# Patient Record
Sex: MALE | Race: WHITE | Employment: FULL TIME | URBAN - METROPOLITAN AREA
[De-identification: names, ages, dates, MRNs, and addresses within clinical notes are randomized per-mention and may not be internally consistent; named-entity substitution may affect disease eponyms.]

---

## 2017-01-29 ENCOUNTER — APPOINTMENT (OUTPATIENT)
Dept: GENERAL RADIOLOGY | Facility: CLINIC | Age: 47
End: 2017-01-29
Attending: NURSE PRACTITIONER
Payer: COMMERCIAL

## 2017-01-29 ENCOUNTER — HOSPITAL ENCOUNTER (EMERGENCY)
Facility: CLINIC | Age: 47
Discharge: HOME | End: 2017-01-29
Attending: NURSE PRACTITIONER | Admitting: NURSE PRACTITIONER
Payer: COMMERCIAL

## 2017-01-29 VITALS — OXYGEN SATURATION: 98 % | DIASTOLIC BLOOD PRESSURE: 87 MMHG | TEMPERATURE: 98 F | SYSTOLIC BLOOD PRESSURE: 148 MMHG

## 2017-01-29 DIAGNOSIS — S62.646A CLOSED NONDISPLACED FRACTURE OF PROXIMAL PHALANX OF RIGHT LITTLE FINGER, INITIAL ENCOUNTER: ICD-10-CM

## 2017-01-29 PROCEDURE — 99213 OFFICE O/P EST LOW 20 MIN: CPT

## 2017-01-29 PROCEDURE — 25000132 ZZH RX MED GY IP 250 OP 250 PS 637: Performed by: NURSE PRACTITIONER

## 2017-01-29 PROCEDURE — 29125 APPL SHORT ARM SPLINT STATIC: CPT

## 2017-01-29 PROCEDURE — 29125 APPL SHORT ARM SPLINT STATIC: CPT | Performed by: NURSE PRACTITIONER

## 2017-01-29 PROCEDURE — 99213 OFFICE O/P EST LOW 20 MIN: CPT | Mod: 25 | Performed by: NURSE PRACTITIONER

## 2017-01-29 PROCEDURE — 73130 X-RAY EXAM OF HAND: CPT | Mod: RT

## 2017-01-29 RX ORDER — HYDROCODONE BITARTRATE AND ACETAMINOPHEN 5; 325 MG/1; MG/1
1-2 TABLET ORAL EVERY 4 HOURS PRN
Qty: 15 TABLET | Refills: 0 | Status: SHIPPED | OUTPATIENT
Start: 2017-01-29 | End: 2017-09-12

## 2017-01-29 RX ORDER — HYDROCODONE BITARTRATE AND ACETAMINOPHEN 5; 325 MG/1; MG/1
1 TABLET ORAL ONCE
Status: COMPLETED | OUTPATIENT
Start: 2017-01-29 | End: 2017-01-29

## 2017-01-29 RX ADMIN — HYDROCODONE BITARTRATE AND ACETAMINOPHEN 1 TABLET: 5; 325 TABLET ORAL at 17:34

## 2017-01-29 NOTE — ED PROVIDER NOTES
History     Chief Complaint   Patient presents with     Fall     fell 1 hour ago, injured right hand and and low back, abrasion on rightside of face-also swelling     HPI  Umberto Narvaez is a 46 year old male with history of obesity, hyperlipidemia, depression, and obstructive sleep apnea who fell today at the end of his driveway taking out the trash.  He slipped on some ice.  He presents presents to urgent care for evaluation of right hand pain, and left low back pain.  He did hit his head but denies any LOC.  He has an abrasion on his right scalp.    Patient Active Problem List   Diagnosis     Obesity     HYPERLIPIDEMIA LDL GOAL <160     Mild recurrent major depression (H)     Sleep related hypoventilation/hypoxemia in other disease     LAURA (obstructive sleep apnea)     Ankle sprain     Edema       No current facility-administered medications on file prior to encounter.  Current Outpatient Prescriptions on File Prior to Encounter:  Sertraline HCl (ZOLOFT PO) Take 150 mg by mouth daily    ORDER FOR DME Respironics BiPAPIPAP 18 cm H2O EPAP 12 cm [id number] need and heated humidity.     ORDER FOR DME Overnight Oximetry on BiPap to be done After 7/26/2012       I have reviewed the Medications, Allergies, Past Medical and Surgical History, and Social History in the Epic system.    Review of Systems  As mentioned above in the history present illness. All other systems were reviewed and are negative.    Physical Exam   BP: 148/87 mmHg  Heart Rate: 80  Temp: 98  F (36.7  C)  SpO2: 98 %  Physical Exam    GENERAL APPEARANCE: healthy, alert and no distress  NECK: supple, nontender, no lymphadenopathy. FROM without pain.  No cervical spine tenderness.  RESP: lungs clear to auscultation - no rales, rhonchi or wheezes  CV: regular rates and rhythm, normal S1 S2, no murmur noted  BACK: No midline back tenderness on exam.  There is diffuse left lumbar paraspinal muscle tenderness to palpation.    RIGHT HAND: swelling and  tenderness of the 5th phalanx.  Limited ROM of 5th phalanx due to pain. No other area of tenderness. FROM of wrist elbow and other fingers.   SKIN:  Right forehead abrasion.       ED Course   Splint application  Date/Time: 1/29/2017 6:26 PM  Performed by: LILY SAVAGE  Authorized by: LILY SAVAGE  Consent: Verbal consent obtained.  Risks and benefits: risks, benefits and alternatives were discussed  Consent given by: patient  Location details: right arm  Splint type: ulnar gutter  Supplies used: elastic bandage,  Ortho-Glass and cotton padding  Post-procedure: The splinted body part was neurovascularly unchanged following the procedure.  Patient tolerance: Patient tolerated the procedure well with no immediate complications               Results for orders placed or performed during the hospital encounter of 01/29/17 (from the past 48 hour(s))   Hand XR, G/E 3 views, right    Narrative    XR HAND RT G/E 3 VW 1/29/2017 5:41 PM    HISTORY: Pain.    COMPARISON: None.      Impression    IMPRESSION: Mildly displaced fracture of the base of the fifth  proximal phalanx.    GEORGIE DALE MD       Labs Ordered and Resulted from Time of ED Arrival Up to the Time of Departure from the ED - No data to display    Assessments & Plan (with Medical Decision Making)     I have reviewed the nursing notes.    I have reviewed the findings, diagnosis, plan and need for follow up with the patient.    New Prescriptions    HYDROCODONE-ACETAMINOPHEN (NORCO) 5-325 MG PER TABLET    Take 1-2 tablets by mouth every 4 hours as needed for moderate to severe pain       Final diagnoses:   Closed nondisplaced fracture of proximal phalanx of right little finger, initial encounter   -patient given Norco 1 tab here in Urgent care today  -bacitracin applied to right forehead abrasion  -Right hand splinted in an ulnar gutter splint  -instructed to f/u with ortho this week-referral sent. Ice affected areas of soreness (back, hand). Use  ibuprofen 400-600 mg up to 4 times per day if needed for pain. Stop if it is causing nausea or abdominal pain.   Add Norco 5-325 (hydrocodone-acetaminophen) 1-2 pills up to every 4 hours if needed for pain. Do not use alcohol, operate machinery, drive, or climb on ladders for 8 hours after taking Norco. Use docusate (100mg) 2 times a day to prevent constipation while on narcotics.      1/29/2017   [facility] EMERGENCY DEPARTMENT      Kirstin, REHANA Reis CNP  01/29/17 5010

## 2017-01-29 NOTE — ED AVS SNAPSHOT
[facility] Emergency Department    [address] [address]    Phone:  [phone]    Fax:  [phone]                                       Umberto Narvaez   MRN: [id number]    Department:  [facility] Emergency Department   Date of Visit:  1/29/2017           After Visit Summary Signature Page     I have received my discharge instructions, and my questions have been answered. I have discussed any challenges I see with this plan with the nurse or doctor.    ..........................................................................................................................................  Patient/Patient Representative Signature      ..........................................................................................................................................  Patient Representative Print Name and Relationship to Patient    ..................................................               ................................................  Date                                            Time    ..........................................................................................................................................  Reviewed by Signature/Title    ...................................................              ..............................................  Date                                                            Time

## 2017-01-30 ENCOUNTER — HOSPITAL ENCOUNTER (EMERGENCY)
Facility: CLINIC | Age: 47
Discharge: HOME | End: 2017-01-30
Attending: PHYSICIAN ASSISTANT | Admitting: PHYSICIAN ASSISTANT
Payer: COMMERCIAL

## 2017-01-30 VITALS
OXYGEN SATURATION: 100 % | DIASTOLIC BLOOD PRESSURE: 82 MMHG | HEART RATE: 100 BPM | SYSTOLIC BLOOD PRESSURE: 144 MMHG | TEMPERATURE: 98.7 F | RESPIRATION RATE: 20 BRPM

## 2017-01-30 DIAGNOSIS — S62.646D CLOSED NONDISPLACED FRACTURE OF PROXIMAL PHALANX OF RIGHT LITTLE FINGER WITH ROUTINE HEALING, SUBSEQUENT ENCOUNTER: ICD-10-CM

## 2017-01-30 PROCEDURE — 99214 OFFICE O/P EST MOD 30 MIN: CPT | Mod: 25

## 2017-01-30 PROCEDURE — 29125 APPL SHORT ARM SPLINT STATIC: CPT | Performed by: PHYSICIAN ASSISTANT

## 2017-01-30 PROCEDURE — 29125 APPL SHORT ARM SPLINT STATIC: CPT

## 2017-01-30 PROCEDURE — 99213 OFFICE O/P EST LOW 20 MIN: CPT | Mod: 25 | Performed by: PHYSICIAN ASSISTANT

## 2017-01-30 NOTE — ED NOTES
Had splint applied last evening. Now fingers are more bruised. He was instructed to return if discoloration occurred. Requesting a sling.

## 2017-01-30 NOTE — ED AVS SNAPSHOT
[facility] Emergency Department    [address] [place] [address]    Phone:  [phone]    Fax:  [phone]                                       Umberto Narvaez   MRN: [id number]    Department:  [facility] Emergency Department   Date of Visit:  1/30/2017           After Visit Summary Signature Page     I have received my discharge instructions, and my questions have been answered. I have discussed any challenges I see with this plan with the nurse or doctor.    ..........................................................................................................................................  Patient/Patient Representative Signature      ..........................................................................................................................................  Patient Representative Print Name and Relationship to Patient    ..................................................               ................................................  Date                                            Time    ..........................................................................................................................................  Reviewed by Signature/Title    ...................................................              ..............................................  Date                                                            Time

## 2017-01-30 NOTE — ED PROVIDER NOTES
History     Chief Complaint   Patient presents with     Cast Problem     Had splint applied last evening. Now fingers are more bruised. He was instructed to return if discoloration occurred. Requesting a sling.      HPI  Umberto Narvaez is a 46 year old male with history of obesity, hyperlipidemia, depression, and LAURA who presents to the ED with complaint of bruised fingers right hand since yesterday. He was evaluated yesterday in the urgent care s/p mechanical fall and found to have right proximal phalanx fracture. He was placed in splint. He has been taking norco for pain with good relief. He re-presents today due to concerns for worsening finger swelling and bruising over his 4th, 3rd digits. He has intermittent paresthesias, denies any currently. No new injury. He was told to return if he develops finger discoloration.     I have reviewed the Medications, Allergies, Past Medical and Surgical History, and Social History in the Epic system.    Review of Systems  Problem-focused review of systems otherwise negative except what is noted in the HPI.   Physical Exam   BP: 144/82 mmHg  Pulse: 100  Temp: 98.7  F (37.1  C)  Resp: 20  SpO2: 100 %  Physical Exam  Nursing note and vitals were reviewed.  Constitutional: alert, well-appearing, no apparent distress   HEENT: Within normal limits.  Cardiovascular: rapid rate, regular rhythm, no murmur  Pulmonary/Chest: clear to auscultation bilaterally  Abdomen: obese  Musculoskeletal: RUE: Splint removed. bruising present volar aspect 3rd, 4th and fifth digit. Capillary refill less than 3 seconds. Sensation intact light and sharp touch distally. Able to wiggle fingers. No wrist tenderness.   Neurological: Alert, oriented, logical thought process  Skin: Warm, dry, no rashes.  Psychiatric: Affect appropriate   ED Course   Splint application  Date/Time: 1/30/2017 3:49 PM  Performed by: DEMETRIUS DARBY  Authorized by: DEMETRIUS DARBY  Consent: Verbal consent obtained.  Risks  "and benefits: risks, benefits and alternatives were discussed  Consent given by: patient  Patient identity confirmed: verbally with patient  Time out: Immediately prior to procedure a \"time out\" was called to verify the correct patient, procedure, equipment, support staff and site/side marked as required.  Location details: right hand  Splint type: ulnar gutter  Supplies used: Ortho-Glass  Post-procedure: The splinted body part was neurovascularly unchanged following the procedure.  Patient tolerance: Patient tolerated the procedure well with no immediate complications                   Labs Ordered and Resulted from Time of ED Arrival Up to the Time of Departure from the ED - No data to display    Assessments & Plan (with Medical Decision Making)   Umberto Narvaez is a 46 year old male with history of obesity, hyperlipidemia, depression, and LAURA who presents to the ED with complaint of bruised fingers right hand since yesterday. He was evaluated yesterday in the urgent care s/p mechanical fall and found to have right proximal phalanx fracture. He was placed in splint. VS are notable for  and mild hypertension 140's systolic. CMS intact in affected extremity. Given that fingers are warm with normal capillary refill and sensation intact, I am not concerned for compartment syndrome. Splint was removed and re-applied with looser fitting ACE wrap. CMS intact post-procedure. Sling provided for increased elevation. I suspect new bruising is a normal part of the healing process - recommended increased elevation. Discussed how to check capillary refill. Discussed return if paresthesias return or worsen. He has an appointment with orthopedist tomorrow which he plans to keep. Discussed \"red flag\" sx warranting return to the UC/ED.     I have reviewed the nursing notes.    I have reviewed the findings, diagnosis, plan and need for follow up with the patient.    Current Discharge Medication List          Final diagnoses: "   Closed nondisplaced fracture of proximal phalanx of right little finger with routine healing, subsequent encounter     Sharita Carmona PA-C   1/30/2017   [facility] EMERGENCY DEPARTMENT      Sharita Carmona PA-C  01/30/17 4440

## 2017-01-30 NOTE — DISCHARGE INSTRUCTIONS
Keep your appointment for tomorrow morning. Elevate. Ice.   Finger Fracture, Closed  You have a broken finger (fracture). This causes local pain, swelling, and bruising. This injury takes about 4 to 6 weeks to heal. Finger injuries are often treated with a splint or cast, or by taping the injured finger to the next one (charis taping). This protects the injured finger and holds the bone in position while it heals. More serious fractures may need surgery.    If the fingernail has been severely injured, it will probably fall off in 1 to 2 weeks. A new fingernail will usually start to grow back within a month.  Home care  Follow these guidelines when caring for yourself at home:    Keep your hand elevated to reduce pain and swelling. When sitting or lying down keep your arm above the level of your heart. You can do this by placing your arm on a pillow that rests on your chest or on a pillow at your side. This is most important during the first 2 days (48 hours) after the injury.    Put an ice pack on the injured area. Do this for 20 minutes every 1 to 2 hours the first day for pain relief. You can make an ice pack by wrapping a plastic bag of ice cubes in a thin towel. As the ice melts, be careful that the cast or splint doesn t get wet. Continue using the ice pack 3 to 4 times a day until the pain and swelling go away.    Keep the cast or splint completely dry at all times. Bathe with your cast or splint out of the water. Protect it with a large plastic bag, rubber-banded at the top end. If a fiberglass cast or splint gets wet, you can dry it with a hair dryer.    If charis tape was put on and it becomes wet or dirty, change it. You may replace it with paper, plastic, or cloth tape. Cloth tape and paper tapes must be kept dry. Keep the charis tape in place for at least 4 weeks.    You may use acetaminophen or ibuprofen to control pain, unless another pain medicine was prescribed. If you have chronic liver or kidney  disease, talk with your health care provider before using these medicines. Also talk with your provider if you ve had a stomach ulcer or GI bleeding.    Don t put creams or objects under the cast if you have itching.  Follow-up care  Follow up with your health care provider within 1 week, or as advised. This is to make sure the bone is healing the way it should.  If X-rays were taken, a radiologist will look at them. You will be told of any new findings that may affect your care.  When to seek medical advice  Call your health care provider right away if any of these occur:    The plaster cast or splint becomes wet or soft    The cast cracks    The fiberglass cast or splint stays wet for more than 24 hours    Pain or swelling gets worse    Redness, warmth, swelling, drainage from the wound, or foul odor from a cast or splint    Finger becomes more cold, blue, numb, or tingly    You can t move your finger    The skin around the cast becomes red    Fever of 101 F (38.3 C) or higher, or as directed by your health care provider    3455-3680 [employer]. [address] [place] [address] 14184. All rights reserved. This information is not intended as a substitute for professional medical care. Always follow your healthcare professional's instructions.

## 2017-01-30 NOTE — ED AVS SNAPSHOT
[facility] Emergency Department    [address] [address]    Phone:  [phone]    Fax:  [phone]                                       Umberto Narvaez   MRN: [id number]    Department:  [facility] Emergency Department   Date of Visit:  1/30/2017           Patient Information     Date Of Birth          1970        Your diagnoses for this visit were:     Nondisplaced fracture of proximal phalanx of right little finger, initial encounter for closed fracture        You were seen by Sharita Carmona PA-C.        Discharge Instructions         Keep your appointment for tomorrow morning. Elevate. Ice.   Finger Fracture, Closed  You have a broken finger (fracture). This causes local pain, swelling, and bruising. This injury takes about 4 to 6 weeks to heal. Finger injuries are often treated with a splint or cast, or by taping the injured finger to the next one (charis taping). This protects the injured finger and holds the bone in position while it heals. More serious fractures may need surgery.    If the fingernail has been severely injured, it will probably fall off in 1 to 2 weeks. A new fingernail will usually start to grow back within a month.  Home care  Follow these guidelines when caring for yourself at home:    Keep your hand elevated to reduce pain and swelling. When sitting or lying down keep your arm above the level of your heart. You can do this by placing your arm on a pillow that rests on your chest or on a pillow at your side. This is most important during the first 2 days (48 hours) after the injury.    Put an ice pack on the injured area. Do this for 20 minutes every 1 to 2 hours the first day for pain relief. You can make an ice pack by wrapping a plastic bag of ice cubes in a thin towel. As the ice melts, be careful that the cast or splint doesn t get wet. Continue using the ice pack 3 to 4 times a day until the pain and swelling go away.    Keep the cast or splint  completely dry at all times. Bathe with your cast or splint out of the water. Protect it with a large plastic bag, rubber-banded at the top end. If a fiberglass cast or splint gets wet, you can dry it with a hair dryer.    If charis tape was put on and it becomes wet or dirty, change it. You may replace it with paper, plastic, or cloth tape. Cloth tape and paper tapes must be kept dry. Keep the charis tape in place for at least 4 weeks.    You may use acetaminophen or ibuprofen to control pain, unless another pain medicine was prescribed. If you have chronic liver or kidney disease, talk with your health care provider before using these medicines. Also talk with your provider if you ve had a stomach ulcer or GI bleeding.    Don t put creams or objects under the cast if you have itching.  Follow-up care  Follow up with your health care provider within 1 week, or as advised. This is to make sure the bone is healing the way it should.  If X-rays were taken, a radiologist will look at them. You will be told of any new findings that may affect your care.  When to seek medical advice  Call your health care provider right away if any of these occur:    The plaster cast or splint becomes wet or soft    The cast cracks    The fiberglass cast or splint stays wet for more than 24 hours    Pain or swelling gets worse    Redness, warmth, swelling, drainage from the wound, or foul odor from a cast or splint    Finger becomes more cold, blue, numb, or tingly    You can t move your finger    The skin around the cast becomes red    Fever of 101 F (38.3 C) or higher, or as directed by your health care provider    1060-2741 [employer]. [address] [place] [address]04. All rights reserved. This information is not intended as a substitute for professional medical care. Always follow your healthcare professional's instructions.          24 Hour Appointment Hotline       To make an appointment at any [place]  clinic, call [phone] ([phone]). If you don't have a family doctor or clinic, we will help you find one. [facility] clinics are conveniently located to serve the needs of you and your family.             Review of your medicines      Our records show that you are taking the medicines listed below. If these are incorrect, please call your family doctor or clinic.        Dose / Directions Last dose taken    HYDROcodone-acetaminophen 5-325 MG per tablet   Commonly known as:  NORCO   Dose:  1-2 tablet   Quantity:  15 tablet        Take 1-2 tablets by mouth every 4 hours as needed for moderate to severe pain   Refills:  0        * order for DME        Respironics BiPAP IPAP 18 cm H2O EPAP 12 cm H2O Lifetime need and heated humidity.   Refills:  0        * order for DME        Overnight Oximetry on BiPap to be done After 7/26/2012   Refills:  0        ZOLOFT PO   Dose:  150 mg        Take 150 mg by mouth daily   Refills:  0        * Notice:  This list has 2 medication(s) that are the same as other medications prescribed for you. Read the directions carefully, and ask your doctor or other care provider to review them with you.            Orders Needing Specimen Collection     None      Pending Results     No orders found from 1/29/2017 to 1/31/2017.            Pending Culture Results     No orders found from 1/29/2017 to 1/31/2017.             Test Results from your hospital stay            Thank you for choosing [facility]       Thank you for choosing [facility] for your care. Our goal is always to provide you with excellent care. Hearing back from our patients is one way we can continue to improve our services. Please take a few minutes to complete the written survey that you may receive in the mail after you visit with us. Thank you!        [employer] Information     [email or web address] lets you send messages to your doctor, view your test results, renew your prescriptions, schedule appointments and more. To sign up, go to  [email or web address] . Click on \"Log in\" on the left side of the screen, which will take you to the Welcome page. Then click on \"Sign up Now\" on the right side of the page.     You will be asked to enter the access code listed below, as well as some personal information. Please follow the directions to create your username and password.     Your access code is: [email or web address]  Expires: 2017  6:19 PM     Your access code will  in 90 days. If you need help or a new code, please call your [place] clinic or [phone].        Care EveryWhere ID     This is your Care EveryWhere ID. This could be used by other organizations to access your [facility] medical records  [id number]        After Visit Summary       This is your record. Keep this with you and show to your community pharmacist(s) and doctor(s) at your next visit.                  "

## 2017-01-30 NOTE — DISCHARGE INSTRUCTIONS
Finger Fracture, Closed  You have a broken finger (fracture). This causes local pain, swelling, and bruising. This injury takes about 4 to 6 weeks to heal. Finger injuries are often treated with a splint or cast, or by taping the injured finger to the next one (charis taping). This protects the injured finger and holds the bone in position while it heals. More serious fractures may need surgery.    If the fingernail has been severely injured, it will probably fall off in 1 to 2 weeks. A new fingernail will usually start to grow back within a month.  Home care  Follow these guidelines when caring for yourself at home:    Keep your hand elevated to reduce pain and swelling. When sitting or lying down keep your arm above the level of your heart. You can do this by placing your arm on a pillow that rests on your chest or on a pillow at your side. This is most important during the first 2 days (48 hours) after the injury.    Put an ice pack on the injured area. Do this for 20 minutes every 1 to 2 hours the first day for pain relief. You can make an ice pack by wrapping a plastic bag of ice cubes in a thin towel. As the ice melts, be careful that the cast or splint doesn t get wet. Continue using the ice pack 3 to 4 times a day until the pain and swelling go away.    Keep the cast or splint completely dry at all times. Bathe with your cast or splint out of the water. Protect it with a large plastic bag, rubber-banded at the top end. If a fiberglass cast or splint gets wet, you can dry it with a hair dryer.    If charis tape was put on and it becomes wet or dirty, change it. You may replace it with paper, plastic, or cloth tape. Cloth tape and paper tapes must be kept dry. Keep the charis tape in place for at least 4 weeks.    You may use acetaminophen or ibuprofen to control pain, unless another pain medicine was prescribed. If you have chronic liver or kidney disease, talk with your health care provider before using  these medicines. Also talk with your provider if you ve had a stomach ulcer or GI bleeding.    Don t put creams or objects under the cast if you have itching.  Follow-up care  Follow up with your health care provider within 1 week, or as advised. This is to make sure the bone is healing the way it should.  If X-rays were taken, a radiologist will look at them. You will be told of any new findings that may affect your care.  When to seek medical advice  Call your health care provider right away if any of these occur:    The plaster cast or splint becomes wet or soft    The cast cracks    The fiberglass cast or splint stays wet for more than 24 hours    Pain or swelling gets worse    Redness, warmth, swelling, drainage from the wound, or foul odor from a cast or splint    Finger becomes more cold, blue, numb, or tingly    You can t move your finger    The skin around the cast becomes red    Fever of 101 F (38.3 C) or higher, or as directed by your health care provider    0766-9600 [employer]. [address] [place] [address]. All rights reserved. This information is not intended as a substitute for professional medical care. Always follow your healthcare professional's instructions.

## 2017-01-31 DIAGNOSIS — G47.33 OSA (OBSTRUCTIVE SLEEP APNEA): Primary | ICD-10-CM

## 2017-01-31 DIAGNOSIS — S62.646A: ICD-10-CM

## 2017-01-31 PROCEDURE — 36415 COLL VENOUS BLD VENIPUNCTURE: CPT | Performed by: ORTHOPAEDIC SURGERY

## 2017-01-31 PROCEDURE — 82306 VITAMIN D 25 HYDROXY: CPT | Performed by: ORTHOPAEDIC SURGERY

## 2017-02-01 LAB — DEPRECATED CALCIDIOL+CALCIFEROL SERPL-MC: 9 UG/L (ref 20–75)

## 2017-04-17 ENCOUNTER — OFFICE VISIT (OUTPATIENT)
Dept: FAMILY MEDICINE | Facility: CLINIC | Age: 47
End: 2017-04-17
Payer: COMMERCIAL

## 2017-04-17 VITALS
HEART RATE: 78 BPM | TEMPERATURE: 97.9 F | DIASTOLIC BLOOD PRESSURE: 77 MMHG | SYSTOLIC BLOOD PRESSURE: 131 MMHG | HEIGHT: 71 IN | BODY MASS INDEX: 44.1 KG/M2 | WEIGHT: 315 LBS

## 2017-04-17 DIAGNOSIS — F33.0 MAJOR DEPRESSIVE DISORDER, RECURRENT EPISODE, MILD (H): ICD-10-CM

## 2017-04-17 DIAGNOSIS — Z00.00 ENCOUNTER FOR ROUTINE ADULT HEALTH EXAMINATION WITHOUT ABNORMAL FINDINGS: Primary | ICD-10-CM

## 2017-04-17 DIAGNOSIS — E66.01 MORBID OBESITY, UNSPECIFIED OBESITY TYPE (H): ICD-10-CM

## 2017-04-17 LAB
CHOLEST SERPL-MCNC: 198 MG/DL
GLUCOSE SERPL-MCNC: 110 MG/DL (ref 70–99)
HDLC SERPL-MCNC: 49 MG/DL
LDLC SERPL CALC-MCNC: 114 MG/DL
NONHDLC SERPL-MCNC: 149 MG/DL
PSA SERPL-ACNC: 0.71 UG/L (ref 0–4)
TRIGL SERPL-MCNC: 175 MG/DL
TSH SERPL DL<=0.005 MIU/L-ACNC: 1.8 MU/L (ref 0.4–4)

## 2017-04-17 PROCEDURE — 99213 OFFICE O/P EST LOW 20 MIN: CPT | Mod: 25 | Performed by: FAMILY MEDICINE

## 2017-04-17 PROCEDURE — 36415 COLL VENOUS BLD VENIPUNCTURE: CPT | Performed by: FAMILY MEDICINE

## 2017-04-17 PROCEDURE — 84443 ASSAY THYROID STIM HORMONE: CPT | Performed by: FAMILY MEDICINE

## 2017-04-17 PROCEDURE — 99386 PREV VISIT NEW AGE 40-64: CPT | Performed by: FAMILY MEDICINE

## 2017-04-17 PROCEDURE — 82947 ASSAY GLUCOSE BLOOD QUANT: CPT | Performed by: FAMILY MEDICINE

## 2017-04-17 PROCEDURE — 80061 LIPID PANEL: CPT | Performed by: FAMILY MEDICINE

## 2017-04-17 PROCEDURE — G0103 PSA SCREENING: HCPCS | Performed by: FAMILY MEDICINE

## 2017-04-17 RX ORDER — BUPROPION HYDROCHLORIDE 150 MG/1
TABLET, EXTENDED RELEASE ORAL
Qty: 60 TABLET | Refills: 2 | Status: SHIPPED | OUTPATIENT
Start: 2017-04-17 | End: 2017-09-12

## 2017-04-17 ASSESSMENT — ANXIETY QUESTIONNAIRES
7. FEELING AFRAID AS IF SOMETHING AWFUL MIGHT HAPPEN: NOT AT ALL
6. BECOMING EASILY ANNOYED OR IRRITABLE: NEARLY EVERY DAY
1. FEELING NERVOUS, ANXIOUS, OR ON EDGE: MORE THAN HALF THE DAYS
GAD7 TOTAL SCORE: 13
3. WORRYING TOO MUCH ABOUT DIFFERENT THINGS: MORE THAN HALF THE DAYS
2. NOT BEING ABLE TO STOP OR CONTROL WORRYING: MORE THAN HALF THE DAYS
5. BEING SO RESTLESS THAT IT IS HARD TO SIT STILL: SEVERAL DAYS

## 2017-04-17 ASSESSMENT — PATIENT HEALTH QUESTIONNAIRE - PHQ9: 5. POOR APPETITE OR OVEREATING: NEARLY EVERY DAY

## 2017-04-17 NOTE — NURSING NOTE
"Chief Complaint   Patient presents with     Physical       Initial /77 (BP Location: Right arm, Cuff Size: Adult Large)  Pulse 78  Temp 97.9  F (36.6  C) (Tympanic)  Ht 5' 10.75\" (1.797 m)  Wt (!) 373 lb (169.2 kg)  BMI 52.39 kg/m2 Estimated body mass index is 52.39 kg/(m^2) as calculated from the following:    Height as of this encounter: 5' 10.75\" (1.797 m).    Weight as of this encounter: 373 lb (169.2 kg).  Medication Reconciliation: complete  "

## 2017-04-17 NOTE — LETTER
[facility]  [address] [address]  Phone: [phone]    April 17, 2017    Umberto Narvaez  [address] [place]  [facility] [address]          Dear Mr. Narvaez,    The results of your recent lab tests showed mildly high cholesterol and you are prediabetic.You need to work on a low fat  diet and exercise.   Component      Latest Ref Rng & Units 4/17/2017   Cholesterol      <200 mg/dL 198   Triglycerides      <150 mg/dL 175 (H)   HDL Cholesterol      >39 mg/dL 49   LDL Cholesterol Calculated      <100 mg/dL 114 (H)   Non HDL Cholesterol      <130 mg/dL 149 (H)   Glucose      70 - 99 mg/dL 110 (H)   TSH      0.40 - 4.00 mU/L 1.80   PSA      0 - 4 ug/L 0.71     If you have any further questions or problems, please contact our office.    Sincerely,      Hood Tolliver MD / jose g

## 2017-04-17 NOTE — PROGRESS NOTES
SUBJECTIVE:     CC: Umberto Narvaez is an 46 year old male who presents for preventative health visit.   Patient is a 46 yr old male here for his annual physical . He reports that he is relatively healthy. He does have a past medical history that is significant for depression. He has been on Zoloft for about a year but he reports that he does not think this helped. He reports not being motivated to do anything. He is presently unemployed and he lost his house last Fall. He also has a pet that is dying. All these make his depression worse. He also has struggled with his weight and he knows that he has to cut down on food.     Patient reports that he will like to try another medication and will also like to start counseling again.  He is requesting that some routine labs.       Healthy Habits:    Do you get at least three servings of calcium containing foods daily (dairy, green leafy vegetables, etc.)? no, taking calcium and/or vitamin D supplement: yes - 6000    Amount of exercise or daily activities, outside of work: no    Problems taking medications regularly not applicable kip D     Medication side effects: No    Have you had an eye exam in the past two years? yes    Do you see a dentist twice per year? yes    Do you have sleep apnea, excessive snoring or daytime drowsiness?ye sleep apnea has pap machine           Depression and Anxiety Follow-Up    Status since last visit: Worsened with off medication and would like to discuss new medication    Other associated symptoms:None    Complicating factors:     Significant life event: Yes-  House lost to foreclose and unemployed      Current substance abuse: None    PHQ-9 SCORE 3/26/2012 6/29/2012 4/17/2017   Total Score 7 9 -   Total Score - - 16     STEFANIA-7 SCORE 4/17/2017   Total Score 13        PHQ-9  English      PHQ-9   Any Language     GAD7       Today's PHQ-2 Score:   PHQ-2 ( 1999 [employer]) 4/17/2017 2/10/2015   Q1: Little interest or pleasure in doing things 3 2    Q2: Feeling down, depressed or hopeless 3 2   PHQ-2 Score 6 4       Abuse: Current or Past(Physical, Sexual or Emotional)- No  Do you feel safe in your environment - Yes    Social History   Substance Use Topics     Smoking status: Never Smoker     Smokeless tobacco: Never Used     Alcohol use Yes      Comment: 1/week     The patient does not drink >3 drinks per day nor >7 drinks per week.    Last PSA: No results found for: PSA    Recent Labs   Lab Test  06/29/12   1008  07/15/10   1045   CHOL  208*  210*   HDL  48  44   LDL  139*  141*   TRIG  108  125   CHOLHDLRATIO  4.3  4.8       Reviewed orders with patient. Reviewed health maintenance and updated orders accordingly - Yes    Reviewed and updated as needed this visit by clinical staff  Tobacco  Allergies  Med Hx  Surg Hx  Fam Hx  Soc Hx        Reviewed and updated as needed this visit by Provider        Past Medical History:   Diagnosis Date     Depression       Past Surgical History:   Procedure Laterality Date     SURGICAL HISTORY OF -   10/09    lasic     TONSILLECTOMY         ROS:  C: NEGATIVE for fever, chills, change in weight  I: NEGATIVE for worrisome rashes, moles or lesions  E: NEGATIVE for vision changes or irritation  ENT: NEGATIVE for ear, mouth and throat problems  R: NEGATIVE for significant cough or SOB  CV: NEGATIVE for chest pain, palpitations or peripheral edema  GI: NEGATIVE for nausea, abdominal pain, heartburn, or change in bowel habits   male: negative for dysuria, hematuria, decreased urinary stream, erectile dysfunction, urethral discharge  M: NEGATIVE for significant arthralgias or myalgia  N: NEGATIVE for weakness, dizziness or paresthesias  PSYCHIATRIC: POSITIVE fordepressed mood    Problem list, Medication list, Allergies, and Medical/Social/Surgical histories reviewed in [facility] and updated as appropriate.  Labs reviewed in EPIC  BP Readings from Last 3 Encounters:   04/17/17 131/77   01/30/17 144/82   01/29/17 148/87    Wt  "Readings from Last 3 Encounters:   04/17/17 (!) 373 lb (169.2 kg)   02/10/15 (!) 392 lb (177.8 kg)   01/08/15 (!) 394 lb (178.7 kg)                  Patient Active Problem List   Diagnosis     Obesity     HYPERLIPIDEMIA LDL GOAL <160     Mild recurrent major depression (H)     Sleep related hypoventilation/hypoxemia in other disease     LAURA (obstructive sleep apnea)     Ankle sprain     Edema     Past Surgical History:   Procedure Laterality Date     SURGICAL HISTORY OF -   10/09    lasic     TONSILLECTOMY         Social History   Substance Use Topics     Smoking status: Never Smoker     Smokeless tobacco: Never Used     Alcohol use Yes      Comment: 1/week     Family History   Problem Relation Age of Onset     Family History Negative Mother      HEART DISEASE Father      heart murmur     Depression Father      Family History Negative Sister      CEREBROVASCULAR DISEASE Paternal Grandfather      DIABETES No family hx of          Current Outpatient Prescriptions   Medication Sig Dispense Refill     buPROPion (WELLBUTRIN SR) 150 MG 12 hr tablet Take 1 tablet once daily and increase to 1 tablet twice daily after 4 to 7 days 60 tablet 2     HYDROcodone-acetaminophen (NORCO) 5-325 MG per tablet Take 1-2 tablets by mouth every 4 hours as needed for moderate to severe pain 15 tablet 0     Sertraline HCl (ZOLOFT PO) Take 150 mg by mouth daily Reported on 4/17/2017       ORDER FOR DME Respironics BiPAP  IPAP 18 cm H2O EPAP 12 cm H2O  Lifetime need and heated humidity.           ORDER FOR DME Overnight Oximetry on BiPap to be done After 7/26/2012         No Known Allergies  OBJECTIVE:     /77 (BP Location: Right arm, Cuff Size: Adult Large)  Pulse 78  Temp 97.9  F (36.6  C) (Tympanic)  Ht 5' 10.75\" (1.797 m)  Wt (!) 373 lb (169.2 kg)  BMI 52.39 kg/m2  EXAM:  GENERAL: healthy, alert and no distress  EYES: Eyes grossly normal to inspection, PERRL and conjunctivae and sclerae normal  HENT: ear canals and TM's normal, " "nose and mouth without ulcers or lesions  NECK: no adenopathy, no asymmetry, masses, or scars and thyroid normal to palpation  RESP: lungs clear to auscultation - no rales, rhonchi or wheezes  CV: regular rate and rhythm, normal S1 S2, no S3 or S4, no murmur, click or rub, no peripheral edema and peripheral pulses strong  ABDOMEN: soft, nontender, no hepatosplenomegaly, no masses and bowel sounds normal  MS: no gross musculoskeletal defects noted, no edema  SKIN: no suspicious lesions or rashes  PSYCH: mentation appears normal, affect normal/bright    ASSESSMENT/PLAN:     (Z00.00) Encounter for routine adult health examination without abnormal findings  (primary encounter diagnosis)  Comment: .Patient will be notified of results  Plan: GLUCOSE, Lipid Profile with reflex to direct         LDL, TSH with free T4 reflex, PSA, screen     (F33.0) Major depressive disorder, recurrent episode, mild (H)  Comment: Patient is started on another medication. Also did a referral for mental health for counseling   Plan: buPROPion (WELLBUTRIN SR) 150 MG 12 hr tablet,         MENTAL HEALTH REFERRAL              (E66.01) Morbid obesity, unspecified obesity type (H)  Comment: Discussed ways that patient can start working on his weight  Plan: He plans on walking and reducing portions.         COUNSELING:  Reviewed preventive health counseling, as reflected in patient instructions       Regular exercise       Healthy diet/nutrition       Vision screening         reports that he has never smoked. He has never used smokeless tobacco.    Estimated body mass index is 52.39 kg/(m^2) as calculated from the following:    Height as of this encounter: 5' 10.75\" (1.797 m).    Weight as of this encounter: 373 lb (169.2 kg).   Weight management plan: Discussed healthy diet and exercise guidelines and patient will follow up in 3 months in clinic to re-evaluate.    Counseling Resources:  ATP IV Guidelines  Pooled Cohorts Equation Calculator  FRAX " Risk Assessment  ICSI Preventive Guidelines  Dietary Guidelines for Americans, 2010  USDA's MyPlate  ASA Prophylaxis  Lung CA Screening    Hood Tolliver MD  [facility]

## 2017-04-17 NOTE — PROGRESS NOTES
Please inform patient that test result showed mildly high cholesterol and he is prediabetic . He needs to work on a low fat  diet and exercise.     Thank you.     Hood Tolliver M.D.

## 2017-04-17 NOTE — MR AVS SNAPSHOT
After Visit Summary   4/17/2017    Umberto Narvaez    MRN: [id number]           Patient Information     Date Of Birth          1970        Visit Information        Provider Department      4/17/2017 8:20 AM Hood Tolliver MD [facility]        Today's Diagnoses     Encounter for routine adult health examination without abnormal findings    -  1    Major depressive disorder, recurrent episode, mild (H)          Care Instructions      Preventive Health Recommendations  Male Ages 40 to 49    Yearly exam:             See your health care provider every year in order to  o   Review health changes.   o   Discuss preventive care.    o   Review your medicines if your doctor has prescribed any.    You should be tested each year for STDs (sexually transmitted diseases) if you re at risk.     Have a cholesterol test every 5 years.     Have a colonoscopy (test for colon cancer) if someone in your family has had colon cancer or polyps before age 50.     After age 45, have a diabetes test (fasting glucose). If you are at risk for diabetes, you should have this test every 3 years.      Talk with your health care provider about whether or not a prostate cancer screening test (PSA) is right for you.    Shots: Get a flu shot each year. Get a tetanus shot every 10 years.     Nutrition:    Eat at least 5 servings of fruits and vegetables daily.     Eat whole-grain bread, whole-wheat pasta and brown rice instead of white grains and rice.     Talk to your provider about Calcium and Vitamin D.     Lifestyle    Exercise for at least 150 minutes a week (30 minutes a day, 5 days a week). This will help you control your weight and prevent disease.     Limit alcohol to one drink per day.     No smoking.     Wear sunscreen to prevent skin cancer.     See your dentist every six months for an exam and cleaning.            Follow-ups after your visit        Additional Services     MENTAL HEALTH REFERRAL     "   Your provider has referred you to: [facility]: [facility] - Counseling (Individual/Couples/Family) - [facility] [phone]   [email or web address]   *Patient will be contacted by [facility] scheduling partner[facility]), to schedule an appointment.  Patients may also call [facility] to schedule.    All scheduling is subject to the client's specific insurance plan & benefits, provider/location availability, and provider clinical specialities.  Please arrive 15 minutes early for your first appointment and bring your completed paperwork.    Please be aware that coverage of these services is subject to the terms and limitations of your health insurance plan.  Call member services at your health plan with any benefit or coverage questions.                  Who to contact     If you have questions or need follow up information about today's clinic visit or your schedule please contact [facility] directly at [phone].  Normal or non-critical lab and imaging results will be communicated to you by [employer], letter or phone within 4 business days after the clinic has received the results. If you do not hear from us within 7 days, please contact the clinic through [employer] or phone. If you have a critical or abnormal lab result, we will notify you by phone as soon as possible.  Submit refill requests through [employer] or call your pharmacy and they will forward the refill request to us. Please allow 3 business days for your refill to be completed.          Additional Information About Your Visit        [employer] Information     [employer] lets you send messages to your doctor, view your test results, renew your prescriptions, schedule appointments and more. To sign up, go to [email or web address] . Click on \"Log in\" on the left side of the screen, which will take you to the Welcome page. Then click on \"Sign up Now\" " "on the right side of the page.     You will be asked to enter the access code listed below, as well as some personal information. Please follow the directions to create your username and password.     Your access code is: [email or web address]  Expires: 2017  7:19 PM     Your access code will  in 90 days. If you need help or a new code, please call your [place] clinic or [phone].        Care EveryWhere ID     This is your Care EveryWhere ID. This could be used by other organizations to access your [place] medical records  [id number]        Your Vitals Were     Pulse Temperature Height BMI (Body Mass Index)          78 97.9  F (36.6  C) (Tympanic) 5' 10.75\" (1.797 m) 52.39 kg/m2         Blood Pressure from Last 3 Encounters:   17 131/77   17 144/82   17 148/87    Weight from Last 3 Encounters:   17 (!) 373 lb (169.2 kg)   02/10/15 (!) 392 lb (177.8 kg)   01/08/15 (!) 394 lb (178.7 kg)              We Performed the Following     DEPRESSION ACTION PLAN (DAP)     GLUCOSE     Lipid Profile with reflex to direct LDL     MENTAL HEALTH REFERRAL     TSH with free T4 reflex          Today's Medication Changes          These changes are accurate as of: 17  8:43 AM.  If you have any questions, ask your nurse or doctor.               Start taking these medicines.        Dose/Directions    buPROPion 150 MG 12 hr tablet   Commonly known as:  WELLBUTRIN SR   Used for:  Major depressive disorder, recurrent episode, mild (H)   Started by:  Hood Tolliver MD        Take 1 tablet once daily and increase to 1 tablet twice daily after 4 to 7 days   Quantity:  60 tablet   Refills:  2            Where to get your medicines      These medications were sent to [facility] [id number] [place] [address]     Phone:  [phone]     buPROPion 150 MG 12 hr tablet                Primary Care Provider Office Phone # Fax #    Corey Gordillo " MD Yolanda [phone]       XXX  XXX XXX  XXX [place] [address]        Thank you!     Thank you for choosing [facility]  for your care. Our goal is always to provide you with excellent care. Hearing back from our patients is one way we can continue to improve our services. Please take a few minutes to complete the written survey that you may receive in the mail after your visit with us. Thank you!             Your Updated Medication List - Protect others around you: Learn how to safely use, store and throw away your medicines at www.disposemymeds.org.          This list is accurate as of: 17  8:43 AM.  Always use your most recent med list.                   Brand Name Dispense Instructions for use    buPROPion 150 MG 12 hr tablet    WELLBUTRIN SR    60 tablet    Take 1 tablet once daily and increase to 1 tablet twice daily after 4 to 7 days       HYDROcodone-acetaminophen 5-325 MG per tablet    NORCO    15 tablet    Take 1-2 tablets by mouth every 4 hours as needed for moderate to severe pain       * order for DME      Respironics BiPAP IPAP 18 cm H2O EPAP 12 cm H2O Lifetime need and heated humidity.       * order for DME      Overnight Oximetry on BiPap to be done After 2012       ZOLOFT PO      Take 150 mg by mouth daily Reported on 2017       * Notice:  This list has 2 medication(s) that are the same as other medications prescribed for you. Read the directions carefully, and ask your doctor or other care provider to review them with you.

## 2017-04-18 ASSESSMENT — ANXIETY QUESTIONNAIRES: GAD7 TOTAL SCORE: 13

## 2017-04-18 ASSESSMENT — PATIENT HEALTH QUESTIONNAIRE - PHQ9: SUM OF ALL RESPONSES TO PHQ QUESTIONS 1-9: 16

## 2017-05-04 DIAGNOSIS — E55.9 AVITAMINOSIS D: Primary | ICD-10-CM

## 2017-05-04 PROCEDURE — 36415 COLL VENOUS BLD VENIPUNCTURE: CPT | Performed by: ORTHOPAEDIC SURGERY

## 2017-05-04 PROCEDURE — 82306 VITAMIN D 25 HYDROXY: CPT | Performed by: ORTHOPAEDIC SURGERY

## 2017-05-05 LAB — DEPRECATED CALCIDIOL+CALCIFEROL SERPL-MC: 35 UG/L (ref 20–75)

## 2017-08-21 ENCOUNTER — OFFICE VISIT (OUTPATIENT)
Dept: FAMILY MEDICINE | Facility: CLINIC | Age: 47
End: 2017-08-21
Payer: COMMERCIAL

## 2017-08-21 VITALS
HEART RATE: 85 BPM | SYSTOLIC BLOOD PRESSURE: 130 MMHG | TEMPERATURE: 99.1 F | HEIGHT: 71 IN | WEIGHT: 315 LBS | DIASTOLIC BLOOD PRESSURE: 71 MMHG | BODY MASS INDEX: 44.1 KG/M2

## 2017-08-21 DIAGNOSIS — F33.0 MAJOR DEPRESSIVE DISORDER, RECURRENT EPISODE, MILD (H): ICD-10-CM

## 2017-08-21 PROCEDURE — 99213 OFFICE O/P EST LOW 20 MIN: CPT | Performed by: FAMILY MEDICINE

## 2017-08-21 RX ORDER — BUPROPION HYDROCHLORIDE 150 MG/1
TABLET, EXTENDED RELEASE ORAL
Qty: 60 TABLET | Refills: 2 | Status: CANCELLED | OUTPATIENT
Start: 2017-08-21

## 2017-08-21 RX ORDER — BUPROPION HYDROCHLORIDE 300 MG/1
300 TABLET ORAL EVERY MORNING
Qty: 30 TABLET | Refills: 3 | Status: SHIPPED | OUTPATIENT
Start: 2017-08-21 | End: 2017-11-03

## 2017-08-21 ASSESSMENT — ANXIETY QUESTIONNAIRES
3. WORRYING TOO MUCH ABOUT DIFFERENT THINGS: NEARLY EVERY DAY
7. FEELING AFRAID AS IF SOMETHING AWFUL MIGHT HAPPEN: SEVERAL DAYS
6. BECOMING EASILY ANNOYED OR IRRITABLE: NEARLY EVERY DAY
1. FEELING NERVOUS, ANXIOUS, OR ON EDGE: MORE THAN HALF THE DAYS
2. NOT BEING ABLE TO STOP OR CONTROL WORRYING: NEARLY EVERY DAY
5. BEING SO RESTLESS THAT IT IS HARD TO SIT STILL: NOT AT ALL
GAD7 TOTAL SCORE: 13

## 2017-08-21 ASSESSMENT — PATIENT HEALTH QUESTIONNAIRE - PHQ9
SUM OF ALL RESPONSES TO PHQ QUESTIONS 1-9: 16
5. POOR APPETITE OR OVEREATING: SEVERAL DAYS

## 2017-08-21 NOTE — MR AVS SNAPSHOT
After Visit Summary   8/21/2017    Umberto Narvaez    MRN: [id number]           Patient Information     Date Of Birth          1970        Visit Information        Provider Department      8/21/2017 1:40 PM Hood Tolliver MD [facility]        Today's Diagnoses     Major depressive disorder, recurrent episode, mild (H)          Care Instructions          Thank you for choosing [facility].  You may be receiving a survey in the mail from [facility] regarding your visit today.  Please take a few minutes to complete and return the survey to let us know how we are doing.      If you have questions or concerns, please contact us via [employer] NOW or you can contact your care team at [phone].    Our Clinic hours are:  Monday 6:40 am  to 7:00 pm  Tuesday -Friday 6:40 am to 5:00 pm    The [place] outpatient lab hours are:  Monday - Friday 6:10 am to 4:45 pm  Saturdays 7:00 am to 11:00 am  Appointments are required, call [phone]    If you have clinical questions after hours or would like to schedule an appointment,  call the clinic at [phone].          Follow-ups after your visit        Additional Services     MENTAL HEALTH REFERRAL       Your provider has referred you to: [facility]: [facility] - [facility]  [phone]   [email or web address]   *Referral from [facility] Primary Care Provider required - Consultation Model - medication management & future refills will be returned to [facility] PCP upon completion of evaluation  *Please call to schedule an appointment.    Has been on Wellbutrin and medication not working. Patient has been on Effexor and Sertraline in the past .    All scheduling is subject to the client's specific insurance plan & benefits, provider/location availability, and provider clinical specialities.  Please arrive 15 minutes early for your first appointment and  "bring your completed paperwork.    Please be aware that coverage of these services is subject to the terms and limitations of your health insurance plan.  Call member services at your health plan with any benefit or coverage questions.                  Who to contact     If you have questions or need follow up information about today's clinic visit or your schedule please contact [facility] directly at [phone].  Normal or non-critical lab and imaging results will be communicated to you by MyChart, letter or phone within 4 business days after the clinic has received the results. If you do not hear from us within 7 days, please contact the clinic through [employer] or phone. If you have a critical or abnormal lab result, we will notify you by phone as soon as possible.  Submit refill requests through [employer] or call your pharmacy and they will forward the refill request to us. Please allow 3 business days for your refill to be completed.          Additional Information About Your Visit        [employer] Information     [employer] gives you secure access to your electronic health record. If you see a primary care provider, you can also send messages to your care team and make appointments. If you have questions, please call your primary care clinic.  If you do not have a primary care provider, please call [phone] and they will assist you.        Care EveryWhere ID     This is your Care EveryWhere ID. This could be used by other organizations to access your [facility] medical records  [id number]        Your Vitals Were     Pulse Temperature Height BMI (Body Mass Index)          85 99.1  F (37.3  C) (Tympanic) 5' 10.75\" (1.797 m) 51.55 kg/m2         Blood Pressure from Last 3 Encounters:   08/21/17 130/71   04/17/17 131/77   01/30/17 144/82    Weight from Last 3 Encounters:   08/21/17 (!) 367 lb (166.5 kg)   04/17/17 (!) 373 lb (169.2 kg)   02/10/15 (!) 392 lb (177.8 kg)              We Performed the " Following     MENTAL HEALTH REFERRAL          Today's Medication Changes          These changes are accurate as of: 17  2:18 PM.  If you have any questions, ask your nurse or doctor.               These medicines have changed or have updated prescriptions.        Dose/Directions    * buPROPion 150 MG 12 hr tablet   Commonly known as:  WELLBUTRIN SR   This may have changed:  Another medication with the same name was added. Make sure you understand how and when to take each.   Used for:  Major depressive disorder, recurrent episode, mild (H)   Changed by:  Hood Tolliver MD        Take 1 tablet once daily and increase to 1 tablet twice daily after 4 to 7 days   Quantity:  60 tablet   Refills:  2       * buPROPion 300 MG 24 hr tablet   Commonly known as:  WELLBUTRIN XL   This may have changed:  You were already taking a medication with the same name, and this prescription was added. Make sure you understand how and when to take each.   Used for:  Major depressive disorder, recurrent episode, mild (H)   Changed by:  Hood Tolliver MD        Dose:  300 mg   Take 1 tablet (300 mg) by mouth every morning   Quantity:  30 tablet   Refills:  3       * Notice:  This list has 2 medication(s) that are the same as other medications prescribed for you. Read the directions carefully, and ask your doctor or other care provider to review them with you.      Stop taking these medicines if you haven't already. Please contact your care team if you have questions.     ZOLOFT PO   Stopped by:  Hood Tolliver MD                Where to get your medicines      These medications were sent to [facility] [id number] [place] [id number]     Phone:  [phone]     buPROPion 300 MG 24 hr tablet                Primary Care Provider Office Phone # Fax #    Corey Guerrero [phone]       XXX  XXX XXX  XXX [place] [address]         Equal Access to Services     [facility] : Hadii aad ku hadstephandoreen Sanford, waaxda luqadaha, qaybta kaalmanery barreto. So [facility] [phone].    ATENCIÓN: Si habla español, tiene a clemons disposición servicios gratuitos de asistencia lingüística. Ralfame al [phone].    We comply with applicable federal civil rights laws and [place] laws. We do not discriminate on the basis of race, color, national origin, age, disability sex, sexual orientation or gender identity.            Thank you!     Thank you for choosing [facility]  for your care. Our goal is always to provide you with excellent care. Hearing back from our patients is one way we can continue to improve our services. Please take a few minutes to complete the written survey that you may receive in the mail after your visit with us. Thank you!             Your Updated Medication List - Protect others around you: Learn how to safely use, store and throw away your medicines at www.disposemymeds.org.          This list is accurate as of: 8/21/17  2:18 PM.  Always use your most recent med list.                   Brand Name Dispense Instructions for use Diagnosis    * buPROPion 150 MG 12 hr tablet    WELLBUTRIN SR    60 tablet    Take 1 tablet once daily and increase to 1 tablet twice daily after 4 to 7 days    Major depressive disorder, recurrent episode, mild (H)       * buPROPion 300 MG 24 hr tablet    WELLBUTRIN XL    30 tablet    Take 1 tablet (300 mg) by mouth every morning    Major depressive disorder, recurrent episode, mild (H)       HYDROcodone-acetaminophen 5-325 MG per tablet    NORCO    15 tablet    Take 1-2 tablets by mouth every 4 hours as needed for moderate to severe pain        * order for [facility]      Respironics BiPAP IPAP 18 cm H2O EPAP 12 cm H2O Lifetime need and heated humidity.    Obstructive sleep apnea (adult) (pediatric), Sleep related hypoventilation/hypoxemia in conditions  classifiable elsewhere       * order for DME      Overnight Oximetry on BiPap to be done After 7/26/2012    Obstructive sleep apnea (adult) (pediatric), Sleep related hypoventilation/hypoxemia in conditions classifiable elsewhere       * Notice:  This list has 4 medication(s) that are the same as other medications prescribed for you. Read the directions carefully, and ask your doctor or other care provider to review them with you.

## 2017-08-21 NOTE — PATIENT INSTRUCTIONS
Thank you for choosing [facility].  You may be receiving a survey in the mail from [employer] regarding your visit today.  Please take a few minutes to complete and return the survey to let us know how we are doing.      If you have questions or concerns, please contact us via NOZA or you can contact your care team at [phone].    Our Clinic hours are:  Monday 6:40 am  to 7:00 pm  Tuesday -Friday 6:40 am to 5:00 pm    The [place] outpatient lab hours are:  Monday - Friday 6:10 am to 4:45 pm  Saturdays 7:00 am to 11:00 am  Appointments are required, call [phone]    If you have clinical questions after hours or would like to schedule an appointment,  call the clinic at [phone].

## 2017-08-22 ASSESSMENT — ANXIETY QUESTIONNAIRES: GAD7 TOTAL SCORE: 13

## 2017-09-11 ENCOUNTER — TELEPHONE (OUTPATIENT)
Dept: FAMILY MEDICINE | Facility: CLINIC | Age: 47
End: 2017-09-11

## 2017-09-11 NOTE — TELEPHONE ENCOUNTER
PHQ9 Due by:11/17/17    Please contact patient to complete follow up PHQ9 before their DUE DATE.     This is important feedback for your care team to monitor how you are doing while taking Wellbuterin.     You completed this same questionnaire   PHQ-9 SCORE 8/21/2017   Total Score -   Total Score 16       MA STAFF: If upon calling patient and PHQ9 score is higher that 10 route to the provider. You may also seek an RN for review.

## 2017-09-12 ENCOUNTER — OFFICE VISIT (OUTPATIENT)
Dept: PSYCHIATRY | Facility: CLINIC | Age: 47
End: 2017-09-12
Attending: FAMILY MEDICINE
Payer: COMMERCIAL

## 2017-09-12 VITALS
WEIGHT: 315 LBS | DIASTOLIC BLOOD PRESSURE: 80 MMHG | HEART RATE: 78 BPM | BODY MASS INDEX: 51.69 KG/M2 | TEMPERATURE: 98 F | SYSTOLIC BLOOD PRESSURE: 133 MMHG

## 2017-09-12 DIAGNOSIS — F33.1 MAJOR DEPRESSIVE DISORDER, RECURRENT EPISODE, MODERATE (H): Primary | ICD-10-CM

## 2017-09-12 DIAGNOSIS — F41.1 GAD (GENERALIZED ANXIETY DISORDER): ICD-10-CM

## 2017-09-12 PROCEDURE — 90792 PSYCH DIAG EVAL W/MED SRVCS: CPT | Performed by: CLINICAL NURSE SPECIALIST

## 2017-09-12 RX ORDER — DULOXETIN HYDROCHLORIDE 30 MG/1
30 CAPSULE, DELAYED RELEASE ORAL DAILY
Qty: 60 CAPSULE | Refills: 1 | Status: SHIPPED | OUTPATIENT
Start: 2017-09-12 | End: 2017-11-03 | Stop reason: DRUGHIGH

## 2017-09-12 ASSESSMENT — ANXIETY QUESTIONNAIRES
6. BECOMING EASILY ANNOYED OR IRRITABLE: NEARLY EVERY DAY
2. NOT BEING ABLE TO STOP OR CONTROL WORRYING: NEARLY EVERY DAY
GAD7 TOTAL SCORE: 15
IF YOU CHECKED OFF ANY PROBLEMS ON THIS QUESTIONNAIRE, HOW DIFFICULT HAVE THESE PROBLEMS MADE IT FOR YOU TO DO YOUR WORK, TAKE CARE OF THINGS AT HOME, OR GET ALONG WITH OTHER PEOPLE: EXTREMELY DIFFICULT
7. FEELING AFRAID AS IF SOMETHING AWFUL MIGHT HAPPEN: SEVERAL DAYS
1. FEELING NERVOUS, ANXIOUS, OR ON EDGE: NEARLY EVERY DAY
3. WORRYING TOO MUCH ABOUT DIFFERENT THINGS: NEARLY EVERY DAY
4. TROUBLE RELAXING: MORE THAN HALF THE DAYS
5. BEING SO RESTLESS THAT IT IS HARD TO SIT STILL: NOT AT ALL

## 2017-09-12 ASSESSMENT — PATIENT HEALTH QUESTIONNAIRE - PHQ9: SUM OF ALL RESPONSES TO PHQ QUESTIONS 1-9: 14

## 2017-09-12 NOTE — PROGRESS NOTES
"                                                         Outpatient Psychiatric Evaluation-Standard    Name: Umberto Narvaez  : 1970  Date: 2017    Source of Referral:  Primary Care Physician: Corey Guerrero (Inactive)  Current Psychotherapist: Rakan Herrera, yolette    Identifying Data:  Patient is a 46 year old, single male who presents for initial visit with me.  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 Patient attended the session alone.   60 minutes were spent on evaluation with 40 minutes CC time.    HPI:  Patient reports long history of depression stating \"I never really liked myself a whole lot\". Patient reports seeing therapists \"on and off\" for several years which was minimally helpful. Patient started antidepressants four years ago, taking venlafaxine (Effexor) which he states \"probably\" was helpful. Patient then tried sertraline (Zoloft) with same result. Patient would decide the medications perhaps were not helpful and would change medications and is taking bupropion (Wellbutrin) for past 5 months. Patient reports the bupropion (Wellbutrin)  mg daily is not helpful.     Patient states he would like to wake up and not worry about the day ahead. Patient reports engaging in \"a lot of negative self talk\". Patient reports spending the day \"In my bedroom, locking out the world\". Patient reports watching television and playing video games to \"pass the hours\".     Patient reports he was fired from an analyst job \"because I wasn't happy there\". Patient last worked 18 months ago. Patient states he has not been looking for employment. Patient states \"I really don't know what I want to do\" for employment. Patient lost his house to [facility]. Patient is now living with his mother.     Psychiatric Review of Symptoms:  Depression: Sleep: Increase and sleeps up to 10-11 hours  Appetite: Increase  Depressed Mood Interest: Decrease Energy: " "Decrease Worthless: No change and related to unemployment and \"I don't like myself a whole lot\"     Last PHQ-9 score = 9 vs 14  Ashley:  No symptoms  Mood Disorder Questionnaire: Negative    Anxiety: Feeling nervous or on edge  Uncontrolled worrying  Trouble relaxing  Easily annoyed or irritable  Thoughts of impending doom    GAD7 score: 15  Panic:  No symptoms  Agoraphobia:  No  OCD:  No symptoms  Psychosis: No symptoms  ADD / ADHD: No symptoms  Gambling or shoplifting: No  Eating Disorder:  No symptoms  Suicide attempts:  No  Current SI risk:  No              Patient reports no suicidal feelings today. In addition, he has notable risk factors for self-harm, including age, single status. However, risk is mitigated by commitment to family. Therefore, based on all available evidence including the factors cited above, he does not appear to be at imminent risk for self-harm, does not meet criteria for a 72-hr hold, and therefore remains appropriate for ongoing outpatient level of care. Currently has a therapist.     Significant Losses / Trauma / Abuse / Neglect Issues:  There are no indications or report of: significant losses, trauma, abuse or neglect.    PTSD:  No symptoms    Issues of possible neglect are not present.    A safety and risk management plan has not been developed at this time, however client was given the after-hours number / 911 should there be a change in any of these risk factors.      Psychiatric History:   Hospitalizations: None  Past psychotherapy: counseling and medication(s) from physician / PCP    Past medication trials: (patient was presented with a list to review all currently available antidepressants, mood stabilizers, tranquilizers, hypnotics and antipsychotics)  New Antidepressants:  Effexor (venlafaxine), Wellbutrin, Zyban, Aplenzin (bupropion) and Zoloft (sertraline)      Chemical Use History:  Patient has not received chemical dependency treatment in the past.  Patient reports no " "problems as a result of their drinking / drug use.  Current use of drugs or alcohol: N/A  CAGE: None of the patient's responses to the CAGE screening were positive / Negative CAGE score   Based on the negative Cage-Aid score and clinical interview there  are not indications of drug or alcohol abuse.  Tobacco use: No  Ready to quit?  No  NRT tried: NA    Past Medical History:  Surgery:   Past Surgical History:   Procedure Laterality Date     SURGICAL HISTORY OF -   10/09    lasic     TONSILLECTOMY       Allergies:  No Known Allergies  Primary MD: Corey Guerrero (Inactive)  Seizures or head injury: No  Diet: \"Normal\"  Exercise: no regular exercise program  Supplements: none    Current Medications:  Current Outpatient Prescriptions   Medication Sig     buPROPion (WELLBUTRIN XL) 300 MG 24 hr tablet Take 1 tablet (300 mg) by mouth every morning     [DISCONTINUED] buPROPion (WELLBUTRIN SR) 150 MG 12 hr tablet Take 1 tablet once daily and increase to 1 tablet twice daily after 4 to 7 days     ORDER FOR DME Respironics BiPAP  IPAP 18 cm H2O EPAP 12 cm H2O  Lifetime need and heated humidity.         ORDER FOR DME Overnight Oximetry on BiPap to be done After 7/26/2012       No current facility-administered medications for this visit.        Vital Signs:  /80 (BP Location: Left arm, Patient Position: Sitting, Cuff Size: Adult Large)  Pulse 78  Temp 98  F (36.7  C) (Tympanic)  Wt (!) 368 lb (166.9 kg)  BMI 51.69 kg/m2      Review of Systems:  (constitutional, HEENT, Neuro, Cardiac, Pulmonary, GI, , Heme / Lymph, Endocrine, Skin / Breast, MSK reviewed)  10 point ROS was negative except for the following: those listed above    Family History:   (with focus on psychiatric and substance abuse)  Chemical use problems None  Mental health history: Father - depression  Patient reports family history includes CEREBROVASCULAR DISEASE in his paternal grandfather; Depression in his father; Family History Negative in his " "mother and sister; HEART DISEASE in his father. There is no history of DIABETES.    Social History:   Patient grew up in [place], [place]    Siblings: 1 sister  Intact family growing up?; Yes  Highest education level was college graduate.   Marital status and living situation: Lives with mother  zero children.   he has not been involved with the legal system.      Mental Status Assessment:     Appearance:  Well groomed      Behavior/relationship to examiner/demeanor:  Cooperative, engaged and pleasant  Motor activity:  Normal  Gait:  Normal   Speech:  Normal in volume, articulation, coherence   Mood (subjective report):  \"Depressed\"  Affect (objective appearance):  Mood congruent  Thought Process (Associations):  Logical, linear and goal directed  Thought content:  No evidence of suicidal or homicidal ideation,          no overt psychosis and                    patient does not appear to be responding to internal stimuli  Oriented to person, place, date/time   Attention Span and concentration: Intact   Memory:  Short-term memory intact and Long-term memory; Intact  Language:  Fluent   Fund of Knowledge/Intelligence:  Average  Use of language: Intact   Abstraction:  Normal  Insight:  Adequate  Judgment:  Adequate for safety    DSM5  Diagnosis:    296.32 (F33.1) Major Depressive Disorder, Recurrent Episode, Moderate _ and With anxious distress  300.02 (F41.1) Generalized Anxiety Disorder     Psychosocial & Contextual Factors: unemployment, financial and housing issues.     Strengths and Liabilities:   Patient identified the following strengths or resources that will help him  succeed in counseling: family support and intelligence.  Things that may interfere with the patient's success include:financial hardship and lack of social support.    WHODAS 2.0 TOTAL SCORES 9/12/2017   Total Score 41         Impression:  Patient is primarily depressed and has anxiety as well. Patient reports benefit from both venlafaxine " (Effexor) and sertraline (Zoloft), but discontinued thinking the medications were ineffective. Duloxetine (Cymbalta) is a good option. Plan to monitor effectiveness of bupropion (Wellbutrin).     Medication side effects and alternatives reviewed.     Treatment Plan:  Continue bupropion (Wellbutrin)  mg in the morning.    Begin duloxetine (Cymbalta) 30 mg daily.     Establish regular sleep pattern - not spending more than 8-9 hours in bed in a 24 hour period.     Continue individual therapy.     Follow up in one month.     - Recommend patient discuss medications with their pharmacist. Risks and benefits of medications discussed, including side effect profile.   - Safety plan was reviewed; to the ER as needed or call after hours crisis line; [phone]  - Education and counseling was done regarding use of medications, psychotherapy options  - Call [phone] for appointment or to speak to a nurse.   -Office hours: Monday through Thursday 8:00 am to 4:30 pm; Friday 8:00 am to Noon.   - Patient was given a copy of this Treatment Plan today.     My Practice Policy was reviewed and signed: YES       Patient will continue to be seen for ongoing consultation and stabilization.      Signed: Marjorie Hunter, RN, MS, APRN                 Psychiatry

## 2017-09-12 NOTE — TELEPHONE ENCOUNTER
Pt called back.  He just did a PHQ-9 with Marjorie Hunter and he got a score of 14.  He did this test on 9/12/2017.  Carmen Love

## 2017-09-12 NOTE — NURSING NOTE
"Chief Complaint   Patient presents with     Consult       Initial /80 (BP Location: Left arm, Patient Position: Sitting, Cuff Size: Adult Large)  Pulse 78  Temp 98  F (36.7  C) (Tympanic)  Wt (!) 368 lb (166.9 kg)  BMI 51.69 kg/m2 Estimated body mass index is 51.69 kg/(m^2) as calculated from the following:    Height as of 8/21/17: 5' 10.75\" (1.797 m).    Weight as of this encounter: 368 lb (166.9 kg).  Medication Reconciliation: complete    "

## 2017-09-12 NOTE — MR AVS SNAPSHOT
After Visit Summary   9/12/2017    Umberto Narvaez    MRN: [id number]           Patient Information     Date Of Birth          1970        Visit Information        Provider Department      9/12/2017 1:15 PM Marjorie Hunter APRN [facility]        Today's Diagnoses     Major depressive disorder, recurrent episode, moderate (H)    -  1    STEFANIA (generalized anxiety disorder)          Care Instructions    Treatment Plan:  Continue bupropion (Wellbutrin)  mg in the morning.    Begin duloxetine (Cymbalta) 30 mg daily.     Establish regular sleep pattern - not spending more than 8-9 hours in bed in a 24 hour period.     Continue individual therapy.     Follow up in one month.     - Recommend patient discuss medications with their pharmacist. Risks and benefits of medications discussed, including side effect profile.   - Safety plan was reviewed; to the ER as needed or call after hours crisis line; [phone]  - Education and counseling was done regarding use of medications, psychotherapy options  - Call [phone] for appointment or to speak to a nurse.   -Office hours: Monday through Thursday 8:00 am to 4:30 pm; Friday 8:00 am to Noon.   - Patient was given a copy of this Treatment Plan today.             Follow-ups after your visit        Who to contact     If you have questions or need follow up information about today's clinic visit or your schedule please contact [facility] directly at [phone].  Normal or non-critical lab and imaging results will be communicated to you by MyChart, letter or phone within 4 business days after the clinic has received the results. If you do not hear from us within 7 days, please contact the clinic through [employer] or phone. If you have a critical or abnormal lab result, we will notify you by phone as soon as possible.  Submit refill requests through [email or web address] or call your pharmacy and they will forward the refill  request to us. Please allow 3 business days for your refill to be completed.          Additional Information About Your Visit        [employer] Information     [email or web address] gives you secure access to your electronic health record. If you see a primary care provider, you can also send messages to your care team and make appointments. If you have questions, please call your primary care clinic.  If you do not have a primary care provider, please call [phone] and they will assist you.        Care EveryWhere ID     This is your Care EveryWhere ID. This could be used by other organizations to access your [facility] medical records  [id number]        Your Vitals Were     Pulse Temperature BMI (Body Mass Index)             78 98  F (36.7  C) (Tympanic) 51.69 kg/m2          Blood Pressure from Last 3 Encounters:   09/12/17 133/80   08/21/17 130/71   04/17/17 131/77    Weight from Last 3 Encounters:   09/12/17 (!) 368 lb (166.9 kg)   08/21/17 (!) 367 lb (166.5 kg)   04/17/17 (!) 373 lb (169.2 kg)              Today, you had the following     No orders found for display         Today's Medication Changes          These changes are accurate as of: 9/12/17  2:03 PM.  If you have any questions, ask your nurse or doctor.               Start taking these medicines.        Dose/Directions    DULoxetine 30 MG EC capsule   Commonly known as:  CYMBALTA   Used for:  Major depressive disorder, recurrent episode, moderate (H), STEFANIA (generalized anxiety disorder)   Started by:  Marjorie Hunter APRN CNS        Dose:  30 mg   Take 1 capsule (30 mg) by mouth daily   Quantity:  60 capsule   Refills:  1         These medicines have changed or have updated prescriptions.        Dose/Directions    buPROPion 300 MG 24 hr tablet   Commonly known as:  WELLBUTRIN XL   This may have changed:  Another medication with the same name was removed. Continue taking this medication, and follow the directions you see here.   Used for:  Major depressive  disorder, recurrent episode, mild (H)   Changed by:  Hood Tolliver MD        Dose:  300 mg   Take 1 tablet (300 mg) by mouth every morning   Quantity:  30 tablet   Refills:  3         Stop taking these medicines if you haven't already. Please contact your care team if you have questions.     HYDROcodone-acetaminophen 5-325 MG per tablet   Commonly known as:  NORCO   Stopped by:  Marjorie Hunter APRN CNS                Where to get your medicines      [place] [facility] [place] [address] [address]     Phone:  [phone]     DULoxetine 30 MG EC capsule                Primary Care Provider Office Phone # Fax #    Corey Guerrero [phone]       XXX  XXX XXX  XXX [place] [address]        Equal Access to Services     [facility]: Hadii vita olivarez hadasho Socolleen, waaxda luqadaha, qaybta kaalmada adeegyada, nery epps . So [facility] [phone].    ATENCIÓN: Si habla español, tiene a clemons disposición servicios gratuitos de asistencia lingüística. Llame al [phone].    We comply with applicable federal civil rights laws and [place] laws. We do not discriminate on the basis of race, color, national origin, age, disability sex, sexual orientation or gender identity.            Thank you!     Thank you for choosing [facility]  for your care. Our goal is always to provide you with excellent care. Hearing back from our patients is one way we can continue to improve our services. Please take a few minutes to complete the written survey that you may receive in the mail after your visit with us. Thank you!             Your Updated Medication List - Protect others around you: Learn how to safely use, store and throw away your medicines at www.disposemymeds.org.          This list is accurate as of: 17  2:03 PM.  Always use your most recent med list.                    Brand Name Dispense Instructions for use Diagnosis    buPROPion 300 MG 24 hr tablet    WELLBUTRIN XL    30 tablet    Take 1 tablet (300 mg) by mouth every morning    Major depressive disorder, recurrent episode, mild (H)       DULoxetine 30 MG EC capsule    CYMBALTA    60 capsule    Take 1 capsule (30 mg) by mouth daily    Major depressive disorder, recurrent episode, moderate (H), STEFANIA (generalized anxiety disorder)       * order for DME      Respironics BiPAP IPAP 18 cm H2O EPAP 12 cm H2O Lifetime need and heated humidity.    Obstructive sleep apnea (adult) (pediatric), Sleep related hypoventilation/hypoxemia in conditions classifiable elsewhere       * order for DME      Overnight Oximetry on BiPap to be done After 7/26/2012    Obstructive sleep apnea (adult) (pediatric), Sleep related hypoventilation/hypoxemia in conditions classifiable elsewhere       * Notice:  This list has 2 medication(s) that are the same as other medications prescribed for you. Read the directions carefully, and ask your doctor or other care provider to review them with you.

## 2017-09-12 NOTE — PATIENT INSTRUCTIONS
Treatment Plan:  Continue bupropion (Wellbutrin)  mg in the morning.    Begin duloxetine (Cymbalta) 30 mg daily.     Establish regular sleep pattern - not spending more than 8-9 hours in bed in a 24 hour period.     Continue individual therapy.     Follow up in one month.     - Recommend patient discuss medications with their pharmacist. Risks and benefits of medications discussed, including side effect profile.   - Safety plan was reviewed; to the ER as needed or call after hours crisis line; [phone]  - Education and counseling was done regarding use of medications, psychotherapy options  - Call [phone] for appointment or to speak to a nurse.   -Office hours: Monday through Thursday 8:00 am to 4:30 pm; Friday 8:00 am to Noon.   - Patient was given a copy of this Treatment Plan today.

## 2017-09-13 ASSESSMENT — ANXIETY QUESTIONNAIRES: GAD7 TOTAL SCORE: 15

## 2017-11-03 ENCOUNTER — OFFICE VISIT (OUTPATIENT)
Dept: PSYCHIATRY | Facility: CLINIC | Age: 47
End: 2017-11-03
Payer: COMMERCIAL

## 2017-11-03 VITALS
WEIGHT: 315 LBS | HEART RATE: 71 BPM | TEMPERATURE: 97.9 F | BODY MASS INDEX: 50.42 KG/M2 | SYSTOLIC BLOOD PRESSURE: 118 MMHG | DIASTOLIC BLOOD PRESSURE: 69 MMHG

## 2017-11-03 DIAGNOSIS — F41.1 GAD (GENERALIZED ANXIETY DISORDER): ICD-10-CM

## 2017-11-03 DIAGNOSIS — F33.0 MAJOR DEPRESSIVE DISORDER, RECURRENT EPISODE, MILD (H): ICD-10-CM

## 2017-11-03 DIAGNOSIS — F33.1 MAJOR DEPRESSIVE DISORDER, RECURRENT EPISODE, MODERATE (H): Primary | ICD-10-CM

## 2017-11-03 PROCEDURE — 99214 OFFICE O/P EST MOD 30 MIN: CPT | Performed by: CLINICAL NURSE SPECIALIST

## 2017-11-03 RX ORDER — BUPROPION HYDROCHLORIDE 300 MG/1
300 TABLET ORAL EVERY MORNING
Qty: 30 TABLET | Refills: 3 | Status: SHIPPED | OUTPATIENT
Start: 2017-11-03 | End: 2018-02-16

## 2017-11-03 RX ORDER — DULOXETIN HYDROCHLORIDE 60 MG/1
60 CAPSULE, DELAYED RELEASE ORAL DAILY
Qty: 30 CAPSULE | Refills: 1 | Status: SHIPPED | OUTPATIENT
Start: 2017-11-03 | End: 2017-12-13

## 2017-11-03 ASSESSMENT — ANXIETY QUESTIONNAIRES
4. TROUBLE RELAXING: MORE THAN HALF THE DAYS
IF YOU CHECKED OFF ANY PROBLEMS ON THIS QUESTIONNAIRE, HOW DIFFICULT HAVE THESE PROBLEMS MADE IT FOR YOU TO DO YOUR WORK, TAKE CARE OF THINGS AT HOME, OR GET ALONG WITH OTHER PEOPLE: NOT DIFFICULT AT ALL
3. WORRYING TOO MUCH ABOUT DIFFERENT THINGS: NEARLY EVERY DAY
GAD7 TOTAL SCORE: 16
5. BEING SO RESTLESS THAT IT IS HARD TO SIT STILL: SEVERAL DAYS
2. NOT BEING ABLE TO STOP OR CONTROL WORRYING: NEARLY EVERY DAY
1. FEELING NERVOUS, ANXIOUS, OR ON EDGE: NEARLY EVERY DAY
6. BECOMING EASILY ANNOYED OR IRRITABLE: NEARLY EVERY DAY
7. FEELING AFRAID AS IF SOMETHING AWFUL MIGHT HAPPEN: SEVERAL DAYS

## 2017-11-03 ASSESSMENT — PATIENT HEALTH QUESTIONNAIRE - PHQ9: SUM OF ALL RESPONSES TO PHQ QUESTIONS 1-9: 16

## 2017-11-03 NOTE — MR AVS SNAPSHOT
After Visit Summary   11/3/2017    Umberto Narvaez    MRN: [id number]           Patient Information     Date Of Birth          1970        Visit Information        Provider Department      11/3/2017 11:15 AM Marjorie Hunter APRN [facility]        Today's Diagnoses     Major depressive disorder, recurrent episode, moderate (H)    -  1    STEFANIA (generalized anxiety disorder)        Major depressive disorder, recurrent episode, mild (H)          Care Instructions    Treatment Plan:  Increase to duloxetine (Cymbalta) 60 mg daily.     Continue bupropion (Wellbutrin)  mg daily.    Establish sleep pattern, begin exercise routine as directed by primary care provider.    Continue individual therapy.     Follow up in one month.     - Recommend patient discuss medications with their pharmacist. Risks and benefits for medications were discussed including, but not limited to, side effects.   - Safety plan was reviewed; to the ER as needed or call after hours crisis line; [phone]  - Education and counseling was done regarding use of medications, psychotherapy options  - Call [phone] for appointment or to speak to a nurse.    -Office hours: Monday through Thursday 8:00 am to 4:30 pm; Friday 8:00 am to Noon.   - Patient received a copy of this Treatment Plan today.          Follow-ups after your visit        Who to contact     If you have questions or need follow up information about today's clinic visit or your schedule please contact [facility] directly at [phone].  Normal or non-critical lab and imaging results will be communicated to you by MyChart, letter or phone within 4 business days after the clinic has received the results. If you do not hear from us within 7 days, please contact the clinic through MyChart or phone. If you have a critical or abnormal lab result, we will notify you by phone as soon as possible.  Submit refill requests through  [employer] or call your pharmacy and they will forward the refill request to us. Please allow 3 business days for your refill to be completed.          Additional Information About Your Visit        [employer] Information     [employer] gives you secure access to your electronic health record. If you see a primary care provider, you can also send messages to your care team and make appointments. If you have questions, please call your primary care clinic.  If you do not have a primary care provider, please call [phone] and they will assist you.        Care EveryWhere ID     This is your Care EveryWhere ID. This could be used by other organizations to access your [place] medical records  [id number]        Your Vitals Were     Pulse Temperature BMI (Body Mass Index)             71 97.9  F (36.6  C) (Tympanic) 50.42 kg/m2          Blood Pressure from Last 3 Encounters:   11/03/17 118/69   09/12/17 133/80   08/21/17 130/71    Weight from Last 3 Encounters:   11/03/17 (!) 359 lb (162.8 kg)   09/12/17 (!) 368 lb (166.9 kg)   08/21/17 (!) 367 lb (166.5 kg)              Today, you had the following     No orders found for display         Today's Medication Changes          These changes are accurate as of: 11/3/17 11:40 AM.  If you have any questions, ask your nurse or doctor.               These medicines have changed or have updated prescriptions.        Dose/Directions    DULoxetine 60 MG EC capsule   Commonly known as:  CYMBALTA   This may have changed:    - medication strength  - how much to take   Used for:  Major depressive disorder, recurrent episode, moderate (H), STEFANIA (generalized anxiety disorder)   Changed by:  Marjorie Hunter APRN CNS        Dose:  60 mg   Take 1 capsule (60 mg) by mouth daily   Quantity:  30 capsule   Refills:  1            Where to get your medicines      [facility] [place] [facility] [address] [facility]  [address] [facility] [address]      Phone:  [phone]     buPROPion 300 MG 24 hr tablet    DULoxetine 60 MG EC capsule                Primary Care Provider Office Phone # Fax #    Corey Guerrero [phone]       XXX  XXX XXX  XXX [place] [address]        Equal Access to Services     [facility] : Hadii aad ku hadasho Soomaali, waaxda luqadaha, qaybta kaalmada adeegyada, waxay peacein mirtan adegalen tuliochristopher epps . So [facility] [phone].    ATENCIÓN: Si habla español, tiene a clemons disposición servicios gratuitos de asistencia lingüística. [facility] [phone].    We comply with applicable federal civil rights laws and [place] laws. We do not discriminate on the basis of race, color, national origin, age, disability, sex, sexual orientation, or gender identity.            Thank you!     Thank you for choosing [facility]  for your care. Our goal is always to provide you with excellent care. Hearing back from our patients is one way we can continue to improve our services. Please take a few minutes to complete the written survey that you may receive in the mail after your visit with us. Thank you!             Your Updated Medication List - Protect others around you: Learn how to safely use, store and throw away your medicines at www.disposemymeds.org.          This list is accurate as of: 11/3/17 11:40 AM.  Always use your most recent med list.                   Brand Name Dispense Instructions for use Diagnosis    buPROPion 300 MG 24 hr tablet    WELLBUTRIN XL    30 tablet    Take 1 tablet (300 mg) by mouth every morning    Major depressive disorder, recurrent episode, mild (H)       DULoxetine 60 MG EC capsule    CYMBALTA    30 capsule    Take 1 capsule (60 mg) by mouth daily    Major depressive disorder, recurrent episode, moderate (H), STEFANIA (generalized anxiety disorder)       * order for DME      Respironics BiPAP IPAP 18 cm H2O EPAP 12 cm H2O Lifetime need and heated humidity.    Obstructive sleep apnea  (adult) (pediatric), Sleep related hypoventilation/hypoxemia in conditions classifiable elsewhere       * order for DME      Overnight Oximetry on BiPap to be done After 7/26/2012    Obstructive sleep apnea (adult) (pediatric), Sleep related hypoventilation/hypoxemia in conditions classifiable elsewhere       * Notice:  This list has 2 medication(s) that are the same as other medications prescribed for you. Read the directions carefully, and ask your doctor or other care provider to review them with you.

## 2017-11-03 NOTE — PROGRESS NOTES
Psychiatric Progress Note    Name: Umberto Narvaez  Date: 11/3/2017  Length of Visit: 30 minutes  MRN: [id number]      Current Outpatient Prescriptions   Medication Sig     DULoxetine (CYMBALTA) 30 MG EC capsule Take 1 capsule (30 mg) by mouth daily     buPROPion (WELLBUTRIN XL) 300 MG 24 hr tablet Take 1 tablet (300 mg) by mouth every morning     ORDER FOR DME Respironics BiPAP  IPAP 18 cm H2O EPAP 12 cm H2O  Lifetime need and heated humidity.         ORDER FOR DME Overnight Oximetry on BiPap to be done After 7/26/2012       No current facility-administered medications for this visit.        Therapist:  Rakan Herrera, weekly    PHQ-9:  PHQ-9 score:    PHQ-9 SCORE 9/12/2017   Total Score -   Total Score 14       STEFANIA-7:  STEFANIA-7 SCORE 4/17/2017 8/21/2017 9/12/2017   Total Score 13 13 15           Interim History:  Patient returns for follow up from initial appointment 9-. Duloxetine (Cymbalta) 30 mg daily was started and bupropion (Wellbutrin)  mg in the morning was continued.     Patient reports the duloxetine (Cymbalta) was helpful with feeling less anxious and mood was improved until about a week ago when his mood went down again related relationship issues with his father. Patient reports he has not established a sleep pattern.     Patient reports inactivity throughout the day. Patient reports staying in his bedroom most of the day. He has a goal with his therapist to apply for one job within three days. Discussed behavioral activation.    Past Medical History:   Diagnosis Date     Depression        10 point ROS is negative except for those listed above.    Vital Signs:   /69 (BP Location: Left arm, Patient Position: Chair, Cuff Size: Adult Large)  Pulse 71  Temp 97.9  F (36.6  C) (Tympanic)  Wt (!) 359 lb (162.8 kg)  BMI 50.42 kg/m2      Mental Status Assessment:  Appearance:  Well groomed      Behavior/relationship to examiner/demeanor:  Cooperative, engaged and pleasant  Motor  "activity:  Normal  Gait:  Normal   Speech:  Normal in volume, articulation, coherence   Mood (subjective report):  \"I'm okay\"  Affect (objective appearance):  Mood congruent  Thought Process (Associations):  Logical, linear and goal directed  Thought content:  No evidence of suicidal or homicidal ideation,          no overt psychosis and                    patient does not appear to be responding to internal stimuli  Oriented to person, place, date/time   Attention Span and concentration: Intact   Memory:  Short-term memory intact and Long-term memory; Intact  Language:  Fluent   Fund of Knowledge/Intelligence:  Average  Use of language: Intact   Abstraction:  Normal  Insight:  Adequate  Judgment:  Adequate for safety    DSM DIAGNOSIS:  296.32 (F33.1) Major Depressive Disorder, Recurrent Episode, Moderate _ and With anxious distress  300.02 (F41.1) Generalized Anxiety Disorder     Assessment:  Patient has noticed some improvement in depression and anxiety; however, it appears the duloxetine (Cymbalta) dose needs an increase. Patient lacks motivation to do activities and will continue to work with his therapist on this issue.     Medication side effects and alternatives were reviewed.     Treatment Plan:  Increase to duloxetine (Cymbalta) 60 mg daily.     Continue bupropion (Wellbutrin)  mg daily.    Establish sleep pattern, begin exercise routine as directed by primary care provider.    Continue individual therapy.     Follow up in one month.     - Recommend patient discuss medications with their pharmacist. Risks and benefits for medications were discussed including, but not limited to, side effects.   - Safety plan was reviewed; to the ER as needed or call after hours crisis line; [phone]  - Education and counseling was done regarding use of medications, psychotherapy options  - Call [phone] for appointment or to speak to a nurse.    -Office hours: Monday through Thursday 8:00 am to 4:30 pm; Friday " 8:00 am to Noon.   - Patient received a copy of this Treatment Plan today.    Patient will continue to be seen for ongoing consultation and stabilization.      Signed:  Marjorie Hunter RN, MS, CNS-BC

## 2017-11-03 NOTE — PATIENT INSTRUCTIONS
Treatment Plan:  Increase to duloxetine (Cymbalta) 60 mg daily.     Continue bupropion (Wellbutrin)  mg daily.    Establish sleep pattern, begin exercise routine as directed by primary care provider.    Continue individual therapy.     Follow up in one month.     - Recommend patient discuss medications with their pharmacist. Risks and benefits for medications were discussed including, but not limited to, side effects.   - Safety plan was reviewed; to the ER as needed or call after hours crisis line; [phone]  - Education and counseling was done regarding use of medications, psychotherapy options  - Call [phone] for appointment or to speak to a nurse.    -Office hours: Monday through Thursday 8:00 am to 4:30 pm; Friday 8:00 am to Noon.   - Patient received a copy of this Treatment Plan today.

## 2017-11-04 ASSESSMENT — ANXIETY QUESTIONNAIRES: GAD7 TOTAL SCORE: 16

## 2017-12-13 DIAGNOSIS — F33.1 MAJOR DEPRESSIVE DISORDER, RECURRENT EPISODE, MODERATE (H): ICD-10-CM

## 2017-12-13 DIAGNOSIS — F41.1 GAD (GENERALIZED ANXIETY DISORDER): ICD-10-CM

## 2017-12-13 RX ORDER — DULOXETIN HYDROCHLORIDE 60 MG/1
60 CAPSULE, DELAYED RELEASE ORAL DAILY
Qty: 30 CAPSULE | Refills: 0 | Status: SHIPPED | OUTPATIENT
Start: 2017-12-13 | End: 2017-12-27

## 2017-12-27 ENCOUNTER — OFFICE VISIT (OUTPATIENT)
Dept: PSYCHIATRY | Facility: CLINIC | Age: 47
End: 2017-12-27
Payer: COMMERCIAL

## 2017-12-27 VITALS
HEART RATE: 75 BPM | WEIGHT: 315 LBS | DIASTOLIC BLOOD PRESSURE: 81 MMHG | BODY MASS INDEX: 49.02 KG/M2 | TEMPERATURE: 97.5 F | SYSTOLIC BLOOD PRESSURE: 137 MMHG

## 2017-12-27 DIAGNOSIS — F33.1 MAJOR DEPRESSIVE DISORDER, RECURRENT EPISODE, MODERATE (H): ICD-10-CM

## 2017-12-27 DIAGNOSIS — F41.1 GAD (GENERALIZED ANXIETY DISORDER): ICD-10-CM

## 2017-12-27 PROCEDURE — 99214 OFFICE O/P EST MOD 30 MIN: CPT | Performed by: CLINICAL NURSE SPECIALIST

## 2017-12-27 RX ORDER — DULOXETIN HYDROCHLORIDE 60 MG/1
60 CAPSULE, DELAYED RELEASE ORAL DAILY
Qty: 30 CAPSULE | Refills: 3 | Status: SHIPPED | OUTPATIENT
Start: 2017-12-27 | End: 2018-04-17

## 2017-12-27 ASSESSMENT — ANXIETY QUESTIONNAIRES
7. FEELING AFRAID AS IF SOMETHING AWFUL MIGHT HAPPEN: SEVERAL DAYS
3. WORRYING TOO MUCH ABOUT DIFFERENT THINGS: NEARLY EVERY DAY
5. BEING SO RESTLESS THAT IT IS HARD TO SIT STILL: NOT AT ALL
1. FEELING NERVOUS, ANXIOUS, OR ON EDGE: MORE THAN HALF THE DAYS
IF YOU CHECKED OFF ANY PROBLEMS ON THIS QUESTIONNAIRE, HOW DIFFICULT HAVE THESE PROBLEMS MADE IT FOR YOU TO DO YOUR WORK, TAKE CARE OF THINGS AT HOME, OR GET ALONG WITH OTHER PEOPLE: VERY DIFFICULT
6. BECOMING EASILY ANNOYED OR IRRITABLE: NEARLY EVERY DAY
2. NOT BEING ABLE TO STOP OR CONTROL WORRYING: MORE THAN HALF THE DAYS
GAD7 TOTAL SCORE: 14
4. TROUBLE RELAXING: NEARLY EVERY DAY

## 2017-12-27 ASSESSMENT — PATIENT HEALTH QUESTIONNAIRE - PHQ9: SUM OF ALL RESPONSES TO PHQ QUESTIONS 1-9: 14

## 2017-12-27 NOTE — MR AVS SNAPSHOT
After Visit Summary   12/27/2017    Umberto Narvaez    MRN: [id number]           Patient Information     Date Of Birth          1970        Visit Information        Provider Department      12/27/2017 10:45 AM Marjorie Hunter APRN [facility]        Today's Diagnoses     Major depressive disorder, recurrent episode, moderate (H)        STEFANIA (generalized anxiety disorder)          Care Instructions    Treatment Plan:  Continue duloxetine (Cymbalta) 60 mg daily and bupropion (Wellbutrin)  mg in the morning.     Continue individual therapy.     Continue to establish sleep pattern, begin exercise routine as directed by primary care provider.    Follow up in 2 months.     - Recommend patient discuss medications with their pharmacist. Risks and benefits for medications were discussed including, but not limited to, side effects.   - Safety plan was reviewed; to the ER as needed or call after hours crisis line; [phone]  - Education and counseling was done regarding use of medications, psychotherapy options  - Call [phone] for appointment or to speak to a nurse.    -Office hours: Monday through Thursday 8:00 am to 4:30 pm; Friday 8:00 am to Noon.             Follow-ups after your visit        Who to contact     If you have questions or need follow up information about today's clinic visit or your schedule please contact [facility] directly at [phone].  Normal or non-critical lab and imaging results will be communicated to you by MyChart, letter or phone within 4 business days after the clinic has received the results. If you do not hear from us within 7 days, please contact the clinic through MyChart or phone. If you have a critical or abnormal lab result, we will notify you by phone as soon as possible.  Submit refill requests through [employer] or call your pharmacy and they will forward the refill request to us. Please allow 3 business days for  your refill to be completed.          Additional Information About Your Visit        MyChart Information     [employer] gives you secure access to your electronic health record. If you see a primary care provider, you can also send messages to your care team and make appointments. If you have questions, please call your primary care clinic.  If you do not have a primary care provider, please call [phone] and they will assist you.        Care EveryWhere ID     This is your Care EveryWhere ID. This could be used by other organizations to access your [place] [facility] medical records  [id number]        Your Vitals Were     Pulse Temperature BMI (Body Mass Index)             75 97.5  F (36.4  C) (Oral) 49.02 kg/m2          Blood Pressure from Last 3 Encounters:   17 137/81   17 118/69   17 133/80    Weight from Last 3 Encounters:   17 (!) 349 lb (158.3 kg)   17 (!) 359 lb (162.8 kg)   17 (!) 368 lb (166.9 kg)              Today, you had the following     No orders found for display         Where to get your medicines      These medications were sent to [address] [address]     Phone:  [phone]     DULoxetine 60 MG EC capsule          Primary Care Provider Office Phone # Fax #    Corey Guerrero [phone]       XXX  XXX XXX  XXX [place] [address]        Equal Access to Services     DAYAMI BURCIAGA : Hadii aad ku hadasho Soomaali, waaxda luqadaha, qaybta kaalmada adeegyada, nery epps . So [facility] [phone].    ATENCIÓN: Si habla español, tiene a clemons disposición servicios gratuitos de asistencia lingüística. Llame al [phone].    We comply with applicable federal civil rights laws and [place] laws. We do not discriminate on the basis of race, color, national origin, age, disability, sex, sexual orientation, or gender identity.            Thank you!      Thank you for choosing [facility]  for your care. Our goal is always to provide you with excellent care. Hearing back from our patients is one way we can continue to improve our services. Please take a few minutes to complete the written survey that you may receive in the mail after your visit with us. Thank you!             Your Updated Medication List - Protect others around you: Learn how to safely use, store and throw away your medicines at www.disposemymeds.org.          This list is accurate as of: 12/27/17 11:12 AM.  Always use your most recent med list.                   Brand Name Dispense Instructions for use Diagnosis    buPROPion 300 MG 24 hr tablet    WELLBUTRIN XL    30 tablet    Take 1 tablet (300 mg) by mouth every morning    Major depressive disorder, recurrent episode, mild (H)       DULoxetine 60 MG EC capsule    CYMBALTA    30 capsule    Take 1 capsule (60 mg) by mouth daily Call today to schedule an appointment [phone]    Major depressive disorder, recurrent episode, moderate (H), STEFANIA (generalized anxiety disorder)       * order for DME      Respironics BiPAP IPAP 18 cm H2O EPAP 12 cm H2O Lifetime need and heated humidity.    Obstructive sleep apnea (adult) (pediatric), Sleep related hypoventilation/hypoxemia in conditions classifiable elsewhere       * order for DME      Overnight Oximetry on BiPap to be done After 7/26/2012    Obstructive sleep apnea (adult) (pediatric), Sleep related hypoventilation/hypoxemia in conditions classifiable elsewhere       * Notice:  This list has 2 medication(s) that are the same as other medications prescribed for you. Read the directions carefully, and ask your doctor or other care provider to review them with you.

## 2017-12-27 NOTE — PATIENT INSTRUCTIONS
Treatment Plan:  Continue duloxetine (Cymbalta) 60 mg daily and bupropion (Wellbutrin)  mg in the morning.     Continue individual therapy.     Continue to establish sleep pattern, begin exercise routine as directed by primary care provider.    Follow up in 2 months.     - Recommend patient discuss medications with their pharmacist. Risks and benefits for medications were discussed including, but not limited to, side effects.   - Safety plan was reviewed; to the ER as needed or call after hours crisis line; [phone]  - Education and counseling was done regarding use of medications, psychotherapy options  - Call [phone] for appointment or to speak to a nurse.    -Office hours: Monday through Thursday 8:00 am to 4:30 pm; Friday 8:00 am to Noon.

## 2017-12-27 NOTE — NURSING NOTE
"Chief Complaint   Patient presents with     Recheck Medication       Initial /81 (BP Location: Right arm, Patient Position: Sitting, Cuff Size: Adult Large)  Pulse 75  Temp 97.5  F (36.4  C) (Oral)  Wt (!) 349 lb (158.3 kg)  BMI 49.02 kg/m2 Estimated body mass index is 49.02 kg/(m^2) as calculated from the following:    Height as of 8/21/17: 5' 10.75\" (1.797 m).    Weight as of this encounter: 349 lb (158.3 kg).  Medication Reconciliation: complete    "

## 2017-12-27 NOTE — PROGRESS NOTES
"      Psychiatric Progress Note    Name: Umberto Narvaez  Date: 12/27/2017  Length of Visit: Spent 30 minutes face to face with this patient, where at least 50 % of this time was spent in counseling on/or about self care.     MRN: [id number]      Current Outpatient Prescriptions   Medication Sig     DULoxetine (CYMBALTA) 60 MG EC capsule Take 1 capsule (60 mg) by mouth daily Call today to schedule an appointment [phone]     buPROPion (WELLBUTRIN XL) 300 MG 24 hr tablet Take 1 tablet (300 mg) by mouth every morning     ORDER FOR DME Respironics BiPAP  IPAP 18 cm H2O EPAP 12 cm H2O  Lifetime need and heated humidity.         ORDER FOR DME Overnight Oximetry on BiPap to be done After 7/26/2012       No current facility-administered medications for this visit.        Therapist:  Rakan Herrera, weekly    PHQ-9:  PHQ-9 score:    PHQ-9 SCORE 11/3/2017   Total Score -   Total Score 16       STEFANIA-7:  STEFANIA-7 SCORE 8/21/2017 9/12/2017 11/3/2017   Total Score 13 15 16           Interim History:  Patient returns for follow up from appointment 11-3-2017. Duloxetine (Cymbalta) was increased to 60 mg daily and bupropion (Wellbutrin)  mg in the morning was continued.     Patient reports feeling better now that he is working again.  Patient started working at [employer], 10-25 hours per week which has been helpful as it gets him out of the house. Patient reports therapist \"has said I'm more upbeat\". Patient continues to isolate, playing video games and watching [employer]. Patient has planned to go to a movie with a friend, but hasn't due to work schedules. Patient reports enjoying gift giving over the holidays. Patient reports the duloxetine (Cymbalta) is helpful as he was able to find a job.     Past Medical History:   Diagnosis Date     Depression        10 point ROS is negative except for those listed above.     Vital Signs:   /81 (BP Location: Right arm, Patient Position: Sitting, Cuff Size: Adult Large)  Pulse 75 " " Temp 97.5  F (36.4  C) (Oral)  Wt (!) 349 lb (158.3 kg)  BMI 49.02 kg/m2      Mental Status Assessment:  Appearance:  Well groomed      Behavior/relationship to examiner/demeanor:  Cooperative, engaged and pleasant  Motor activity:  Normal  Gait:  Normal   Speech:  Normal in volume, articulation, coherence   Mood (subjective report):  \"Better\"  Affect (objective appearance):  Mood congruent  Thought Process (Associations):  Logical, linear and goal directed  Thought content:  No evidence of suicidal or homicidal ideation,          no overt psychosis and                    patient does not appear to be responding to internal stimuli  Oriented to person, place, date/time   Attention Span and concentration: Intact   Memory:  Short-term memory intact and Long-term memory; Intact  Language:  Fluent   Fund of Knowledge/Intelligence:  Average  Use of language: Intact   Abstraction:  Normal  Insight:  Adequate  Judgment:  Adequate for safety    DSM DIAGNOSIS:  296.32 (F33.1) Major Depressive Disorder, Recurrent Episode, Moderate _ and With anxious distress  300.02 (F41.1) Generalized Anxiety Disorder     Assessment:  Patient has noticed improvement with the increase in duloxetine (Cymbalta).     Medication side effects and alternatives were reviewed.     Treatment Plan:  Continue duloxetine (Cymbalta) 60 mg daily and bupropion (Wellbutrin)  mg in the morning.     Continue individual therapy.     Continue to establish sleep pattern, begin exercise routine as directed by primary care provider.    Follow up in 2 months.     - Recommend patient discuss medications with their pharmacist. Risks and benefits for medications were discussed including, but not limited to, side effects.   - Safety plan was reviewed; to the ER as needed or call after hours crisis line; [phone]  - Education and counseling was done regarding use of medications, psychotherapy options  - Call [phone] for appointment or to speak to a " nurse.    -Office hours: Monday through Thursday 8:00 am to 4:30 pm; Friday 8:00 am to Noon.   - Patient received a copy of this Treatment Plan today.    Patient will continue to be seen for ongoing consultation and stabilization.      Signed:  Marjorie Hunter RN, MS, CNS-BC

## 2017-12-28 ASSESSMENT — ANXIETY QUESTIONNAIRES: GAD7 TOTAL SCORE: 14

## 2018-02-16 ENCOUNTER — HOSPITAL ENCOUNTER (EMERGENCY)
Facility: CLINIC | Age: 48
Discharge: HOME | End: 2018-02-16
Attending: NURSE PRACTITIONER | Admitting: NURSE PRACTITIONER
Payer: COMMERCIAL

## 2018-02-16 VITALS
OXYGEN SATURATION: 98 % | RESPIRATION RATE: 10 BRPM | SYSTOLIC BLOOD PRESSURE: 170 MMHG | DIASTOLIC BLOOD PRESSURE: 103 MMHG | TEMPERATURE: 97.7 F

## 2018-02-16 DIAGNOSIS — F33.0 MAJOR DEPRESSIVE DISORDER, RECURRENT EPISODE, MILD (H): ICD-10-CM

## 2018-02-16 DIAGNOSIS — J06.9 VIRAL URI WITH COUGH: Primary | ICD-10-CM

## 2018-02-16 LAB
FLUAV+FLUBV AG SPEC QL: NEGATIVE
FLUAV+FLUBV AG SPEC QL: NEGATIVE
INTERNAL QC OK POCT: YES
S PYO AG THROAT QL IA.RAPID: NEGATIVE
SPECIMEN SOURCE: NORMAL

## 2018-02-16 PROCEDURE — 87880 STREP A ASSAY W/OPTIC: CPT | Performed by: NURSE PRACTITIONER

## 2018-02-16 PROCEDURE — G0463 HOSPITAL OUTPT CLINIC VISIT: HCPCS

## 2018-02-16 PROCEDURE — 87081 CULTURE SCREEN ONLY: CPT | Performed by: NURSE PRACTITIONER

## 2018-02-16 PROCEDURE — 99213 OFFICE O/P EST LOW 20 MIN: CPT | Performed by: NURSE PRACTITIONER

## 2018-02-16 PROCEDURE — 87804 INFLUENZA ASSAY W/OPTIC: CPT | Performed by: NURSE PRACTITIONER

## 2018-02-16 RX ORDER — DEXTROMETHORPHAN POLISTIREX 30 MG/5ML
60 SUSPENSION ORAL EVERY MORNING
Qty: 148 ML | Refills: 0 | Status: SHIPPED | OUTPATIENT
Start: 2018-02-16 | End: 2018-04-17

## 2018-02-16 RX ORDER — CODEINE PHOSPHATE/GUAIFENESIN 10-100MG/5
10 LIQUID (ML) ORAL EVERY 6 HOURS PRN
Qty: 180 ML | Refills: 0 | Status: SHIPPED | OUTPATIENT
Start: 2018-02-16 | End: 2018-04-17

## 2018-02-16 ASSESSMENT — ENCOUNTER SYMPTOMS
SORE THROAT: 1
SPEECH DIFFICULTY: 0
DIAPHORESIS: 1
COUGH: 1
WHEEZING: 1
EYE DISCHARGE: 0
DYSURIA: 0
NUMBNESS: 0
RHINORRHEA: 1
SHORTNESS OF BREATH: 0
SEIZURES: 0
ABDOMINAL PAIN: 0
SINUS PAIN: 0
FEVER: 0
FREQUENCY: 0
NAUSEA: 0
DIARRHEA: 0
HEMATURIA: 0
HEADACHES: 0
EYE REDNESS: 0
EYE PAIN: 0
ACTIVITY CHANGE: 1
VOMITING: 0
CHILLS: 1
SINUS PRESSURE: 1
FATIGUE: 1
DIFFICULTY URINATING: 0

## 2018-02-16 NOTE — ED PROVIDER NOTES
History     Chief Complaint   Patient presents with     Cough     chest congestion     HPI     Umberto Narvaez is a 47 year old male who presents to the ED with a 7 day history of sore throat, congestion, ear pressure, ear pain, non productive cough, achiness and sweats, chills, occasional wheezing.  He has tried prosper seltzer severe cold day and night time medication with improvement of symptoms.  He denies chest pain, decreased appetite, decreased urine output, diarrhea, dizziness, vomiting and wheezing.  He has been exposed to ill contacts at work. Predisposing factors include None.      Problem List:    Patient Active Problem List    Diagnosis Date Noted     Ankle sprain 09/05/2012     Priority: Medium     Edema 09/05/2012     Priority: Medium     Sleep related hypoventilation/hypoxemia in other disease 08/10/2012     Priority: Medium     Problem list name updated by automated process. Provider to review       LAURA (obstructive sleep apnea) 08/10/2012     Priority: Medium     , desats to 74%       Mild recurrent major depression (H) 03/26/2012     Priority: Medium     HYPERLIPIDEMIA LDL GOAL <160 10/31/2010     Priority: Medium     Obesity 04/10/2008     Priority: Medium     Problem list name updated by automated process. Provider to review          Past Medical History:    Past Medical History:   Diagnosis Date     Depression        Past Surgical History:    Past Surgical History:   Procedure Laterality Date     SURGICAL HISTORY OF -   10/09    lasic     TONSILLECTOMY         Family History:    Family History   Problem Relation Age of Onset     Family History Negative Mother      HEART DISEASE Father      heart murmur     Depression Father      Family History Negative Sister      CEREBROVASCULAR DISEASE Paternal Grandfather      DIABETES No family hx of        Social History:  Marital Status:  Single [1]  Social History   Substance Use Topics     Smoking status: Never Smoker     Smokeless tobacco: Never  Used     Alcohol use Yes      Comment: 1/week        Medications:      guaiFENesin-codeine (GUAIFENESIN AC) 100-10 MG/5ML SYRP syrup   dextromethorphan (DELSYM) 30 MG/5ML liquid   DULoxetine (CYMBALTA) 60 MG EC capsule   buPROPion (WELLBUTRIN XL) 300 MG 24 hr tablet   ORDER FOR DME   ORDER FOR DME     Review of Systems   Constitutional: Positive for activity change, chills, diaphoresis and fatigue. Negative for fever.   HENT: Positive for congestion, ear pain, rhinorrhea, sinus pressure and sore throat. Negative for ear discharge, sinus pain and sneezing.    Eyes: Negative for pain, discharge and redness.   Respiratory: Positive for cough and wheezing. Negative for shortness of breath.    Cardiovascular: Negative for chest pain.   Gastrointestinal: Negative for abdominal pain, diarrhea, nausea and vomiting.   Genitourinary: Negative for difficulty urinating, dysuria, frequency and hematuria.   Neurological: Negative for seizures, speech difficulty, numbness and headaches.   All other systems reviewed and are negative.      Physical Exam   BP: (!) 170/103  Heart Rate: 96  Temp: 97.7  F (36.5  C)  Resp: 10  SpO2: 98 %      Physical Exam  General: alert, in mild distress and ill appearing  Eyes: conjunctivae not injected /corneas clear. PERRL, EOM's intact.  Ears: negative, External ears normal. Canals clear. TM's normal.  Nose: clear rhinorrhea and mild , pink erythema, nasal congestion sounding  Mouth/Throat: erythematous, normal: no lesions, erythema, adenopthy or exudate and rapid strep negative.  Trismus is not present. Muffled voice is not present. Uvular shift is not present.   Neck: Neck supple. No adenopathy. Thyroid symmetric, normal size,  Chest/Pulmonary: clear to auscultation  Cardiovascular: RRR normal S1S2 without  murmurs, rubs or gallops. PMI normal  Skin:  Skin color, texture, turgor normal. No rashes or lesions., positives: diaphoretic      ED Course     ED Course     Procedures      Labs Ordered and  Resulted from Time of ED Arrival Up to the Time of Departure from the ED   RAPID STREP GROUP A SCREEN POCT   INFLUENZA A/B ANTIGEN   BETA STREP GROUP A CULTURE     Results for orders placed or performed during the hospital encounter of 02/16/18   Rapid strep group A screen POCT   Result Value Ref Range    Rapid Strep A Screen negative neg    Internal QC OK Yes    Influenza A/B antigen   Result Value Ref Range    Influenza A/B Agn Specimen Nasal     Influenza A Negative NEG^Negative    Influenza B Negative NEG^Negative     Assessments & Plan (with Medical Decision Making)     I have reviewed the nursing notes.    I have reviewed the findings, diagnosis, plan and need for follow up with the patient.  Medical Decision Making:  Upper respiratory infection symptoms with Abnormal - elevated blood pressure and recommend follow up next week for repeat blood pressure and also advised to stop cold medications.   DDx:  bronchitis, pneumonia, Viral URI (ie.. Influenza), sinusitis  DDx:  Strep pharyngitis, viral pharyngitis, URI, peritonsillar abscess, retropharyngeal abscess, mono, epiglottitis   CXR is not indicated.  Rapid Strep test is indicated.     Assessment:  Viral upper respiratory illness and likely influenza     Plan:   Tylenol, Fluids, Rest and rx cough medication  Advised patient of elevated blood pressure and seriousness of elevated blood pressure.  Recommended to stop all over-the-counter cough cold type medications unless I have specifically prescribed it or advised it and discharge instructions.  Recommend follow-up repeat blood pressure evaluation with primary care next week.  Reassurance given regarding lack of signs of serious infection.  Discussed home treatment with cough suppressants.  Recommend follow up in primary care as needed, or sooner if symptoms persist. Return to the ED with fever, trouble swallowing or breathing, or any other concerns.     Condition on disposition: Stable    New Prescriptions     DEXTROMETHORPHAN (DELSYM) 30 MG/5ML LIQUID    Take 10 mLs (60 mg) by mouth every morning    GUAIFENESIN-CODEINE (GUAIFENESIN AC) 100-10 MG/5ML SYRP SYRUP    Take 10 mLs by mouth every 6 hours as needed for cough       Final diagnoses:   Viral URI with cough       2/16/2018   [facility] EMERGENCY DEPARTMENT     Priti Hdz, REHANA CNP  02/16/18 [id number]

## 2018-02-16 NOTE — LETTER
[facility] EMERGENCY DEPARTMENT  [address] [address]  Phone: [phone]  Fax: [phone]    February 16, 2018        Umberto Narvaez  [address] [address]          To whom it may concern:    RE: Umberto Narvaez    Patient was seen and treated today at our clinic and missed work.  Patient may return to work 02/20/2018 with the following:  No working or lifting restrictions    Please contact me for questions or concerns.      Sincerely,        Priti GUERRERO, CNM, FNP, DNP

## 2018-02-16 NOTE — ED AVS SNAPSHOT
[facility] Emergency Department    [address] [facility] [address]    Phone:  [phone]    Fax:  [phone]                                       Umberto Narvaez   MRN: [id number]    Department:  [facility] Emergency Department   Date of Visit:  2/16/2018           Patient Information     Date Of Birth          1970        Your diagnoses for this visit were:     Viral URI with cough        You were seen by Priti Hdz APRN CNP.      Follow-up Information     Follow up with Hood Tolliver MD In 5 days.    Specialty:  Family Practice    Why:  for recheck of blood pressure    Contact information:    [address] [facility] [id number] [phone]          Discharge Instructions         You have been prescribed delsym and robitussin with codeine.  You may take the delsym every am for cough.  In addition you may also take tylenol 1000 mg every 6 hours as needed for chills, fatigue, fever.  At night you may take robitussin with codeine cough syrup as needed for cough.  Follow up next week with primary provider for recheck of blood pressure and illness.  Viral Upper Respiratory Illness (Adult)  You have a viral upper respiratory illness (URI), which is another term for the common cold. This illness is contagious during the first few days. It is spread through the air by coughing and sneezing. It may also be spread by direct contact (touching the sick person and then touching your own eyes, nose, or mouth). Frequent handwashing will decrease risk of spread. Most viral illnesses go away within 7 to 10 days with rest and simple home remedies. Sometimes the illness may last for several weeks. Antibiotics will not kill a virus, and they are generally not prescribed for this condition.    Home care    If symptoms are severe, rest at home for the first 2 to 3 days. When you resume activity, don't let yourself get too tired.    Avoid being exposed to cigarette smoke (yours or  others ).    You may use acetaminophen or ibuprofen to control pain and fever, unless another medicine was prescribed. (Note: If you have chronic liver or kidney disease, have ever had a stomach ulcer or gastrointestinal bleeding, or are taking blood-thinning medicines, talk with your healthcare provider before using these medicines.) Aspirin should never be given to anyone under 18 years of age who is ill with a viral infection or fever. It may cause severe liver or brain damage.    Your appetite may be poor, so a light diet is fine. Avoid dehydration by drinking 6 to 8 glasses of fluids per day (water, soft drinks, juices, tea, or soup). Extra fluids will help loosen secretions in the nose and lungs.    Over-the-counter cold medicines will not shorten the length of time you re sick, but they may be helpful for the following symptoms: cough, sore throat, and nasal and sinus congestion. (Note: Do not use decongestants if you have high blood pressure.)  Follow-up care  Follow up with your healthcare provider, or as advised.  When to seek medical advice  Call your healthcare provider right away if any of these occur:    Cough with lots of colored sputum (mucus)    Severe headache; face, neck, or ear pain    Difficulty swallowing due to throat pain    Fever of 100.4 F (38 C)  Call 911, or get immediate medical care  Call emergency services right away if any of these occur:    Chest pain, shortness of breath, wheezing, or difficulty breathing    Coughing up blood    Inability to swallow due to throat pain  Date Last Reviewed: 9/13/2015 2000-2017 [employer]. [address] [place] [address], [place] [address]. All rights reserved. This information is not intended as a substitute for professional medical care. Always follow your healthcare professional's instructions.          Future Appointments        Provider Department Dept Phone Center    2/27/2018 12:15 PM REHANA [facility]  [phone] [facility]      24 Hour Appointment Hotline       To make an appointment at any [facility], call [phone] ([phone]). If you don't have a family doctor or clinic, we will help you find one. [facility] are conveniently located to serve the needs of you and your family.             Review of your medicines      START taking        Dose / Directions Last dose taken    dextromethorphan 30 MG/5ML liquid   Commonly known as:  DELSYM   Dose:  60 mg   Quantity:  148 mL        Take 10 mLs (60 mg) by mouth every morning   Refills:  0        guaiFENesin-codeine 100-10 MG/5ML Syrp syrup   Commonly known as:  guaiFENesin AC   Dose:  10 mL   Quantity:  180 mL        Take 10 mLs by mouth every 6 hours as needed for cough   Refills:  0          Our records show that you are taking the medicines listed below. If these are incorrect, please call your family doctor or clinic.        Dose / Directions Last dose taken    buPROPion 300 MG 24 hr tablet   Commonly known as:  WELLBUTRIN XL   Dose:  300 mg   Quantity:  30 tablet        Take 1 tablet (300 mg) by mouth every morning   Refills:  3        DULoxetine 60 MG EC capsule   Commonly known as:  CYMBALTA   Dose:  60 mg   Quantity:  30 capsule        Take 1 capsule (60 mg) by mouth daily Call today to schedule an appointment [phone]   Refills:  3        * order for DME        Respironics BiPAP IPAP 18 cm H2O EPAP 12 cm H2O Lifetime need and heated humidity.   Refills:  0        * order for DME        Overnight Oximetry on BiPap to be done After 7/26/2012   Refills:  0        * Notice:  This list has 2 medication(s) that are the same as other medications prescribed for you. Read the directions carefully, and ask your doctor or other care provider to review them with you.            Prescriptions were sent or printed at these locations (2 Prescriptions)                   [facility] [place], [place] - [address]   [address],  [place] [address]    Telephone:  [phone]   Fax:  [phone]   Hours:                  E-Prescribed (1 of 2)         dextromethorphan (DELSYM) 30 MG/5ML liquid                 Printed at Department/Unit printer (1 of 2)         guaiFENesin-codeine (GUAIFENESIN AC) 100-10 MG/5ML SYRP syrup                Procedures and tests performed during your visit     Beta strep group A r/o culture    Influenza A/B antigen    Rapid strep group A screen POCT      Orders Needing Specimen Collection     None      Pending Results     Date and Time Order Name Status Description    2/16/2018 1348 Beta strep group A r/o culture In process             Pending Culture Results     Date and Time Order Name Status Description    2/16/2018 1348 Beta strep group A r/o culture In process             Pending Results Instructions     If you had any lab results that were not finalized at the time of your Discharge, you can call the ED Lab Result RN at [phone]. You will be contacted by this team for any positive Lab results or changes in treatment. The nurses are available 7 days a week from 10A to 6:30P.  You can leave a message 24 hours per day and they will return your call.        Test Results From Your Hospital Stay        2/16/2018  1:48 PM      Component Results     Component Value Ref Range & Units Status    Rapid Strep A Screen negative neg Final    Internal QC OK Yes  Final         2/16/2018  2:18 PM      Component Results     Component Value Ref Range & Units Status    Influenza A/B Agn Specimen Nasal  Final    Influenza A Negative NEG^Negative Final    Influenza B Negative NEG^Negative Final    Test results must be correlated with clinical data. If necessary, results   should be confirmed by a molecular assay or viral culture.           2/16/2018  2:29 PM                Thank you for choosing [facility]       Thank you for choosing [facility] for your care. Our goal is always to provide you with excellent care. Hearing back from  our patients is one way we can continue to improve our services. Please take a few minutes to complete the written survey that you may receive in the mail after you visit with us. Thank you!        [employer] Information     [employer] gives you secure access to your electronic health record. If you see a primary care provider, you can also send messages to your care team and make appointments. If you have questions, please call your primary care clinic.  If you do not have a primary care provider, please call [phone] and they will assist you.        Care EveryWhere ID     This is your Care EveryWhere ID. This could be used by other organizations to access your [facility] medical records  [id number]        Equal Access to Services     RADHA BURCIAGA : Pricila Sanford, cirilo ortiz, moncho barrios, nery duncan. So [facility] [phone].    ATENCIÓN: Si habla español, tiene a clemons disposición servicios gratuitos de asistencia lingüística. Llame al [phone].    We comply with applicable federal civil rights laws and [place] laws. We do not discriminate on the basis of race, color, national origin, age, disability, sex, sexual orientation, or gender identity.            After Visit Summary       This is your record. Keep this with you and show to your community pharmacist(s) and doctor(s) at your next visit.

## 2018-02-16 NOTE — DISCHARGE INSTRUCTIONS
You have been prescribed delsym and robitussin with codeine.  You may take the delsym every am for cough.  In addition you may also take tylenol 1000 mg every 6 hours as needed for chills, fatigue, fever.  At night you may take robitussin with codeine cough syrup as needed for cough.  Follow up next week with primary provider for recheck of blood pressure and illness.  Viral Upper Respiratory Illness (Adult)  You have a viral upper respiratory illness (URI), which is another term for the common cold. This illness is contagious during the first few days. It is spread through the air by coughing and sneezing. It may also be spread by direct contact (touching the sick person and then touching your own eyes, nose, or mouth). Frequent handwashing will decrease risk of spread. Most viral illnesses go away within 7 to 10 days with rest and simple home remedies. Sometimes the illness may last for several weeks. Antibiotics will not kill a virus, and they are generally not prescribed for this condition.    Home care    If symptoms are severe, rest at home for the first 2 to 3 days. When you resume activity, don't let yourself get too tired.    Avoid being exposed to cigarette smoke (yours or others ).    You may use acetaminophen or ibuprofen to control pain and fever, unless another medicine was prescribed. (Note: If you have chronic liver or kidney disease, have ever had a stomach ulcer or gastrointestinal bleeding, or are taking blood-thinning medicines, talk with your healthcare provider before using these medicines.) Aspirin should never be given to anyone under 18 years of age who is ill with a viral infection or fever. It may cause severe liver or brain damage.    Your appetite may be poor, so a light diet is fine. Avoid dehydration by drinking 6 to 8 glasses of fluids per day (water, soft drinks, juices, tea, or soup). Extra fluids will help loosen secretions in the nose and lungs.    Over-the-counter cold  medicines will not shorten the length of time you re sick, but they may be helpful for the following symptoms: cough, sore throat, and nasal and sinus congestion. (Note: Do not use decongestants if you have high blood pressure.)  Follow-up care  Follow up with your healthcare provider, or as advised.  When to seek medical advice  Call your healthcare provider right away if any of these occur:    Cough with lots of colored sputum (mucus)    Severe headache; face, neck, or ear pain    Difficulty swallowing due to throat pain    Fever of 100.4 F (38 C)  Call 911, or get immediate medical care  Call emergency services right away if any of these occur:    Chest pain, shortness of breath, wheezing, or difficulty breathing    Coughing up blood    Inability to swallow due to throat pain  Date Last Reviewed: 9/13/2015 2000-2017 [employer]. [address] [place] [address]. All rights reserved. This information is not intended as a substitute for professional medical care. Always follow your healthcare professional's instructions.

## 2018-02-16 NOTE — ED AVS SNAPSHOT
[facility] Emergency Department    [address] [facility] [address]    Phone:  [phone]    Fax:  [phone]                                       Umberto Narvaez   MRN: [id number]    Department:  [facility] Emergency Department   Date of Visit:  2/16/2018           After Visit Summary Signature Page     I have received my discharge instructions, and my questions have been answered. I have discussed any challenges I see with this plan with the nurse or doctor.    ..........................................................................................................................................  Patient/Patient Representative Signature      ..........................................................................................................................................  Patient Representative Print Name and Relationship to Patient    ..................................................               ................................................  Date                                            Time    ..........................................................................................................................................  Reviewed by Signature/Title    ...................................................              ..............................................  Date                                                            Time

## 2018-02-18 LAB
BACTERIA SPEC CULT: NORMAL
SPECIMEN SOURCE: NORMAL

## 2018-02-19 RX ORDER — BUPROPION HYDROCHLORIDE 300 MG/1
300 TABLET ORAL EVERY MORNING
Qty: 30 TABLET | Refills: 3 | Status: SHIPPED | OUTPATIENT
Start: 2018-02-19 | End: 2018-04-17

## 2018-02-22 ENCOUNTER — TELEPHONE (OUTPATIENT)
Dept: FAMILY MEDICINE | Facility: CLINIC | Age: 48
End: 2018-02-22

## 2018-02-22 ENCOUNTER — ALLIED HEALTH/NURSE VISIT (OUTPATIENT)
Dept: FAMILY MEDICINE | Facility: CLINIC | Age: 48
End: 2018-02-22
Payer: COMMERCIAL

## 2018-02-22 VITALS — SYSTOLIC BLOOD PRESSURE: 112 MMHG | HEART RATE: 71 BPM | DIASTOLIC BLOOD PRESSURE: 77 MMHG

## 2018-02-22 DIAGNOSIS — Z01.30 BP CHECK: Primary | ICD-10-CM

## 2018-02-22 PROCEDURE — 99207 ZZC NO CHARGE NURSE ONLY: CPT

## 2018-02-22 NOTE — NURSING NOTE
Patient is following up for BP check from elevated Bp when he was in the ED 2/16/18 for viral URI with cough.  His BP in ED was BP: (!) 170/103  Today's BP:  112/77      Routing to provider for TONYA HERMAN Rn

## 2018-02-22 NOTE — MR AVS SNAPSHOT
After Visit Summary   2/22/2018    Umberto Narvaez    MRN: [id number]           Patient Information     Date Of Birth          1970        Visit Information        Provider Department      2/22/2018 11:00 AM JUSTINE MEEK/LETHA RN [facility]        Today's Diagnoses     BP check    -  1       Follow-ups after your visit        Your next 10 appointments already scheduled     Feb 27, 2018 12:15 PM CST   Return Visit with REHANA Kothari CNS   [facility] ([facility]    [address] [facility] [address] [phone]              Who to contact     If you have questions or need follow up information about today's clinic visit or your schedule please contact [facility] directly at [phone].  Normal or non-critical lab and imaging results will be communicated to you by MyChart, letter or phone within 4 business days after the clinic has received the results. If you do not hear from us within 7 days, please contact the clinic through MyChart or phone. If you have a critical or abnormal lab result, we will notify you by phone as soon as possible.  Submit refill requests through [email or web address] or call your pharmacy and they will forward the refill request to us. Please allow 3 business days for your refill to be completed.          Additional Information About Your Visit        MyChart Information     [email or web address] gives you secure access to your electronic health record. If you see a primary care provider, you can also send messages to your care team and make appointments. If you have questions, please call your primary care clinic.  If you do not have a primary care provider, please call [phone] and they will assist you.        Care EveryWhere ID     This is your Care EveryWhere ID. This could be used by other organizations to access your [facility] medical records  [id number]        Your Vitals Were     Pulse                   71             Blood Pressure from Last 3 Encounters:   02/22/18 112/77   02/16/18 (!) 170/103   12/27/17 137/81    Weight from Last 3 Encounters:   12/27/17 (!) 349 lb (158.3 kg)   11/03/17 (!) 359 lb (162.8 kg)   09/12/17 (!) 368 lb (166.9 kg)              Today, you had the following     No orders found for display       Primary Care Provider Office Phone # Fax #    Akashvirgilelyssa Tolliver [phone] [address] [facility] [address]        Equal Access to Services     DAYAMI BURCIAGA : Hadii vita Sanford, wasujatha ortiz, moncho kaalmada sophie, nery epps . So [facility] [phone].    ATENCIÓN: Si habla español, tiene a clemons disposición servicios gratuitos de asistencia lingüística. [facility] [phone].    We comply with applicable federal civil rights laws and [place] laws. We do not discriminate on the basis of race, color, national origin, age, disability, sex, sexual orientation, or gender identity.            Thank you!     Thank you for choosing [facility]  for your care. Our goal is always to provide you with excellent care. Hearing back from our patients is one way we can continue to improve our services. Please take a few minutes to complete the written survey that you may receive in the mail after your visit with us. Thank you!             Your Updated Medication List - Protect others around you: Learn how to safely use, store and throw away your medicines at www.disposemymeds.org.          This list is accurate as of 2/22/18 11:30 AM.  Always use your most recent med list.                   Brand Name Dispense Instructions for use Diagnosis    buPROPion 300 MG 24 hr tablet    WELLBUTRIN XL    30 tablet    Take 1 tablet (300 mg) by mouth every morning    Major depressive disorder, recurrent episode, mild (H)       dextromethorphan 30 MG/5ML liquid    DELSYM    148 mL    Take 10 mLs (60 mg) by mouth every morning         DULoxetine 60 MG EC capsule    CYMBALTA    30 capsule    Take 1 capsule (60 mg) by mouth daily Call today to schedule an appointment [phone]    Major depressive disorder, recurrent episode, moderate (H), STEFANIA (generalized anxiety disorder)       guaiFENesin-codeine 100-10 MG/5ML Syrp syrup    guaiFENesin AC    180 mL    Take 10 mLs by mouth every 6 hours as needed for cough    Viral URI with cough       * order for DME      Respironics BiPAP IPAP 18 cm H2O EPAP 12 cm H2O Lifetime need and heated humidity.    Obstructive sleep apnea (adult) (pediatric), Sleep related hypoventilation/hypoxemia in conditions classifiable elsewhere       * order for DME      Overnight Oximetry on BiPap to be done After 7/26/2012    Obstructive sleep apnea (adult) (pediatric), Sleep related hypoventilation/hypoxemia in conditions classifiable elsewhere       * Notice:  This list has 2 medication(s) that are the same as other medications prescribed for you. Read the directions carefully, and ask your doctor or other care provider to review them with you.

## 2018-03-23 ENCOUNTER — TELEPHONE (OUTPATIENT)
Dept: PSYCHIATRY | Facility: CLINIC | Age: 48
End: 2018-03-23

## 2018-03-23 NOTE — TELEPHONE ENCOUNTER
Reason for call:  Other   Patient called regarding (reason for call): prescription  Additional comments: Patient needs refill of Bupropion 300mg.  He uses [facility] and has f/u on 4/17.     Phone number to reach patient:  Home number on file [phone] (home)    Best Time:  any    Can we leave a detailed message on this number?  YES

## 2018-04-17 ENCOUNTER — OFFICE VISIT (OUTPATIENT)
Dept: PSYCHIATRY | Facility: CLINIC | Age: 48
End: 2018-04-17
Payer: COMMERCIAL

## 2018-04-17 VITALS
HEART RATE: 78 BPM | DIASTOLIC BLOOD PRESSURE: 72 MMHG | SYSTOLIC BLOOD PRESSURE: 146 MMHG | BODY MASS INDEX: 49.3 KG/M2 | WEIGHT: 315 LBS | TEMPERATURE: 97.7 F

## 2018-04-17 DIAGNOSIS — F41.1 GAD (GENERALIZED ANXIETY DISORDER): ICD-10-CM

## 2018-04-17 DIAGNOSIS — F33.1 MAJOR DEPRESSIVE DISORDER, RECURRENT EPISODE, MODERATE (H): ICD-10-CM

## 2018-04-17 DIAGNOSIS — F33.0 MAJOR DEPRESSIVE DISORDER, RECURRENT EPISODE, MILD (H): ICD-10-CM

## 2018-04-17 PROCEDURE — 99214 OFFICE O/P EST MOD 30 MIN: CPT | Performed by: CLINICAL NURSE SPECIALIST

## 2018-04-17 RX ORDER — DULOXETIN HYDROCHLORIDE 60 MG/1
60 CAPSULE, DELAYED RELEASE ORAL DAILY
Qty: 30 CAPSULE | Refills: 3 | Status: SHIPPED | OUTPATIENT
Start: 2018-04-17 | End: 2018-10-15

## 2018-04-17 RX ORDER — BUPROPION HYDROCHLORIDE 300 MG/1
300 TABLET ORAL EVERY MORNING
Qty: 30 TABLET | Refills: 3 | Status: SHIPPED | OUTPATIENT
Start: 2018-04-17 | End: 2018-10-15

## 2018-04-17 ASSESSMENT — ANXIETY QUESTIONNAIRES
GAD7 TOTAL SCORE: 14
2. NOT BEING ABLE TO STOP OR CONTROL WORRYING: NEARLY EVERY DAY
1. FEELING NERVOUS, ANXIOUS, OR ON EDGE: NEARLY EVERY DAY
6. BECOMING EASILY ANNOYED OR IRRITABLE: NEARLY EVERY DAY
3. WORRYING TOO MUCH ABOUT DIFFERENT THINGS: NEARLY EVERY DAY
7. FEELING AFRAID AS IF SOMETHING AWFUL MIGHT HAPPEN: NOT AT ALL
4. TROUBLE RELAXING: MORE THAN HALF THE DAYS
IF YOU CHECKED OFF ANY PROBLEMS ON THIS QUESTIONNAIRE, HOW DIFFICULT HAVE THESE PROBLEMS MADE IT FOR YOU TO DO YOUR WORK, TAKE CARE OF THINGS AT HOME, OR GET ALONG WITH OTHER PEOPLE: VERY DIFFICULT
5. BEING SO RESTLESS THAT IT IS HARD TO SIT STILL: NOT AT ALL

## 2018-04-17 NOTE — MR AVS SNAPSHOT
After Visit Summary   4/17/2018    Umberto Narvaez    MRN: [id number]           Patient Information     Date Of Birth          1970        Visit Information        Provider Department      4/17/2018 12:45 PM Marjorie Hunter APRN [facility]        Today's Diagnoses     Major depressive disorder, recurrent episode, moderate (H)        STEFANIA (generalized anxiety disorder)        Major depressive disorder, recurrent episode, mild (H)          Care Instructions    Treatment Plan:  Continue duloxetine (Cymbalta) 60 mg daily and bupropion (Wellbutrin)  mg in the morning.     Continue individual therapy.     Follow up in 2-3 months.     - Recommend patient discuss medications with their pharmacist. Risks and benefits for medications were discussed including, but not limited to, side effects.   - Safety plan was reviewed; to the ER as needed or call after hours crisis line; [phone]  - Education and counseling was done regarding use of medications, psychotherapy options  - Call [phone] for appointment or to speak to a nurse.    -Office hours: Monday through Thursday 8:00 am to 4:30 pm.           Follow-ups after your visit        Who to contact     If you have questions or need follow up information about today's clinic visit or your schedule please contact [facility] directly at [phone].  Normal or non-critical lab and imaging results will be communicated to you by MyChart, letter or phone within 4 business days after the clinic has received the results. If you do not hear from us within 7 days, please contact the clinic through [employer] or phone. If you have a critical or abnormal lab result, we will notify you by phone as soon as possible.  Submit refill requests through [employer] or call your pharmacy and they will forward the refill request to us. Please allow 3 business days for your refill to be completed.          Additional Information About  Your Visit        [employer] Information     [employer] gives you secure access to your electronic health record. If you see a primary care provider, you can also send messages to your care team and make appointments. If you have questions, please call your primary care clinic.  If you do not have a primary care provider, please call [phone] and they will assist you.        Care EveryWhere ID     This is your Care EveryWhere ID. This could be used by other organizations to access your [place] medical records  [id number]        Your Vitals Were     Pulse Temperature BMI (Body Mass Index)             78 97.7  F (36.5  C) (Tympanic) 49.3 kg/m2          Blood Pressure from Last 3 Encounters:   04/17/18 146/72   02/22/18 112/77   02/16/18 (!) 170/103    Weight from Last 3 Encounters:   04/17/18 (!) 351 lb (159.2 kg)   12/27/17 (!) 349 lb (158.3 kg)   11/03/17 (!) 359 lb (162.8 kg)              Today, you had the following     No orders found for display         Today's Medication Changes          These changes are accurate as of 4/17/18  1:13 PM.  If you have any questions, ask your nurse or doctor.               Stop taking these medicines if you haven't already. Please contact your care team if you have questions.     dextromethorphan 30 MG/5ML liquid   Commonly known as:  DELSYM   Stopped by:  Marjorie Hunter APRN CNS           guaiFENesin-codeine 100-10 MG/5ML Syrp syrup   Commonly known as:  guaiFENesin AC   Stopped by:  Marjorie Hunter APRN CNS                Where to get your medicines      These medications were sent to [facility] [id number] [place] [address]  [id number] [place] [address]     Phone:  [phone]     buPROPion 300 MG 24 hr tablet    DULoxetine 60 MG EC capsule                Primary Care Provider Office Phone # Fax #    Hood Tolliver [phone] [address] [facility] [address]        Equal Access to Services      RADHA BURCIAGA : Hadii aad ku simeon Somichaelali, waaxda luqadaha, qaybta kaalmada adesandi, nery vianey mirtacarlos averybisichristopher epps . So [facility] [phone].    ATENCIÓN: Si habla español, tiene a clemons disposición servicios gratuitos de asistencia lingüística. Llame al [phone].    We comply with applicable federal civil rights laws and [place] laws. We do not discriminate on the basis of race, color, national origin, age, disability, sex, sexual orientation, or gender identity.            Thank you!     Thank you for choosing [facility]  for your care. Our goal is always to provide you with excellent care. Hearing back from our patients is one way we can continue to improve our services. Please take a few minutes to complete the written survey that you may receive in the mail after your visit with us. Thank you!             Your Updated Medication List - Protect others around you: Learn how to safely use, store and throw away your medicines at www.disposemymeds.org.          This list is accurate as of 4/17/18  1:13 PM.  Always use your most recent med list.                   Brand Name Dispense Instructions for use Diagnosis    buPROPion 300 MG 24 hr tablet    WELLBUTRIN XL    30 tablet    Take 1 tablet (300 mg) by mouth every morning    Major depressive disorder, recurrent episode, mild (H)       DULoxetine 60 MG EC capsule    CYMBALTA    30 capsule    Take 1 capsule (60 mg) by mouth daily Call today to schedule an appointment [phone]    Major depressive disorder, recurrent episode, moderate (H), STEFANIA (generalized anxiety disorder)       * order for DME      Respironics BiPAP IPAP 18 cm H2O EPAP 12 cm H2O Lifetime need and heated humidity.    Obstructive sleep apnea (adult) (pediatric), Sleep related hypoventilation/hypoxemia in conditions classifiable elsewhere       * order for DME      Overnight Oximetry on BiPap to be done After 7/26/2012    Obstructive sleep apnea (adult) (pediatric),  Sleep related hypoventilation/hypoxemia in conditions classifiable elsewhere       * Notice:  This list has 2 medication(s) that are the same as other medications prescribed for you. Read the directions carefully, and ask your doctor or other care provider to review them with you.

## 2018-04-17 NOTE — PROGRESS NOTES
"      Psychiatric Progress Note    Name: Umberto Narvaez  Date: 4/17/2018  Length of Visit: Spent 30 minutes face to face with this patient, where at least 50 % of this time was spent in counseling on/or about goal setting.     MRN: [id number]      Current Outpatient Prescriptions   Medication Sig     buPROPion (WELLBUTRIN XL) 300 MG 24 hr tablet Take 1 tablet (300 mg) by mouth every morning     DULoxetine (CYMBALTA) 60 MG EC capsule Take 1 capsule (60 mg) by mouth daily Call today to schedule an appointment [phone]     ORDER FOR DME Respironics BiPAP  IPAP 18 cm H2O EPAP 12 cm H2O  Lifetime need and heated humidity.         ORDER FOR DME Overnight Oximetry on BiPap to be done After 7/26/2012       No current facility-administered medications for this visit.        Therapist:  Rakan Herrera, weekly    PHQ-9:  PHQ-9 score:    PHQ-9 SCORE 4/17/2018   Total Score -   Total Score 15       STEFANIA-7:  STEFANIA-7 SCORE 9/12/2017 11/3/2017 12/27/2017   Total Score 15 16 14           Interim History:  Patient returns for follow up from appointment 12-. Duloxetine (Cymbalta) 60 mg daily and bupropion (Wellbutrin)  mg in the morning were continued. Plan was for patient to return in 2 months.     Patient reports \"things are not a lot different than they were\". Patient is working part time at [employer] \"for a horrible wage, but it gets me out of the house\". Patient reports needing more hours to support himself. Patient reports has meeting with [employer] this afternoon. Patient reports doesn't answer his phone \"because it's all creditors\". Patient [place] socializing somewhat more[place]     Patient reports continued work with his therapist to achieve goals which is an improvement. Patient reports the medications are helpful and denies side effects. Patient reports feeling much better than previous.       Past Medical History:   Diagnosis Date     Depression        10 point ROS is " "negative except for those listed above.     Vital Signs:   /72 (BP Location: Left arm, Patient Position: Sitting, Cuff Size: Adult Large)  Pulse 78  Temp 97.7  F (36.5  C) (Tympanic)  Wt (!) 351 lb (159.2 kg)  BMI 49.3 kg/m2      Mental Status Assessment:  Appearance:  Well groomed      Behavior/relationship to examiner/demeanor:  Cooperative, engaged and pleasant  Motor activity:  Normal  Gait:  Normal   Speech:  Normal in volume, articulation, coherence   Mood (subjective report):  \"The same\"  Affect (objective appearance):  Mood congruent  Thought Process (Associations):  Logical, linear and goal directed  Thought content:  No evidence of suicidal or homicidal ideation,          no overt psychosis and                    patient does not appear to be responding to internal stimuli  Oriented to person, place, date/time   Attention Span and concentration: Intact   Memory:  Short-term memory intact and Long-term memory; Intact  Language:  Fluent   Fund of Knowledge/Intelligence:  Average  Use of language: Intact   Abstraction:  Normal  Insight:  Adequate  Judgment:  Adequate for safety    DSM DIAGNOSIS:  296.32 (F33.1) Major Depressive Disorder, Recurrent Episode, Moderate _ and With anxious distress  300.02 (F41.1) Generalized Anxiety Disorder     Assessment:  Medications are helpful in helping patient move forward, working towards goals related to working and supporting himself. Therapy has been most effective for patient as it hold him accountable to work towards goals with a deadline.     Medication side effects and alternatives were reviewed.     Treatment Plan:  Continue duloxetine (Cymbalta) 60 mg daily and bupropion (Wellbutrin)  mg in the morning.     Continue individual therapy.     Follow up in 2-3 months.     - Recommend patient discuss medications with their pharmacist. Risks and benefits for medications were discussed including, but not limited to, side effects.   - Safety plan was " reviewed; to the ER as needed or call after hours crisis line; [phone]  - Education and counseling was done regarding use of medications, psychotherapy options  - Call [phone] for appointment or to speak to a nurse.    -Office hours: Monday through Thursday 8:00 am to 4:30 pm.   - Patient received a copy of this Treatment Plan today.    Patient will continue to be seen for ongoing consultation and stabilization.      Signed:  Marjorie Hunter RN, MS, CNS-BC

## 2018-04-17 NOTE — NURSING NOTE
"Chief Complaint   Patient presents with     Recheck Medication       Initial /72 (BP Location: Left arm, Patient Position: Sitting, Cuff Size: Adult Large)  Pulse 78  Temp 97.7  F (36.5  C) (Tympanic)  Wt (!) 351 lb (159.2 kg)  BMI 49.3 kg/m2 Estimated body mass index is 49.3 kg/(m^2) as calculated from the following:    Height as of 8/21/17: 5' 10.75\" (1.797 m).    Weight as of this encounter: 351 lb (159.2 kg).  Medication Reconciliation: complete    "

## 2018-04-17 NOTE — PATIENT INSTRUCTIONS
Treatment Plan:  Continue duloxetine (Cymbalta) 60 mg daily and bupropion (Wellbutrin)  mg in the morning.     Continue individual therapy.     Follow up in 2-3 months.     - Recommend patient discuss medications with their pharmacist. Risks and benefits for medications were discussed including, but not limited to, side effects.   - Safety plan was reviewed; to the ER as needed or call after hours crisis line; [phone]  - Education and counseling was done regarding use of medications, psychotherapy options  - Call [phone] for appointment or to speak to a nurse.    -Office hours: Monday through Thursday 8:00 am to 4:30 pm.

## 2018-04-18 ASSESSMENT — PATIENT HEALTH QUESTIONNAIRE - PHQ9: SUM OF ALL RESPONSES TO PHQ QUESTIONS 1-9: 15

## 2018-04-18 ASSESSMENT — ANXIETY QUESTIONNAIRES: GAD7 TOTAL SCORE: 14

## 2018-06-06 ENCOUNTER — OFFICE VISIT (OUTPATIENT)
Dept: PSYCHIATRY | Facility: CLINIC | Age: 48
End: 2018-06-06
Payer: COMMERCIAL

## 2018-06-06 VITALS
WEIGHT: 315 LBS | SYSTOLIC BLOOD PRESSURE: 125 MMHG | BODY MASS INDEX: 49.3 KG/M2 | DIASTOLIC BLOOD PRESSURE: 78 MMHG | TEMPERATURE: 98.5 F | HEART RATE: 66 BPM

## 2018-06-06 DIAGNOSIS — F41.1 GAD (GENERALIZED ANXIETY DISORDER): ICD-10-CM

## 2018-06-06 DIAGNOSIS — F33.1 MAJOR DEPRESSIVE DISORDER, RECURRENT EPISODE, MODERATE (H): Primary | ICD-10-CM

## 2018-06-06 PROCEDURE — 99214 OFFICE O/P EST MOD 30 MIN: CPT | Performed by: CLINICAL NURSE SPECIALIST

## 2018-06-06 ASSESSMENT — ANXIETY QUESTIONNAIRES
1. FEELING NERVOUS, ANXIOUS, OR ON EDGE: SEVERAL DAYS
5. BEING SO RESTLESS THAT IT IS HARD TO SIT STILL: NOT AT ALL
6. BECOMING EASILY ANNOYED OR IRRITABLE: NEARLY EVERY DAY
3. WORRYING TOO MUCH ABOUT DIFFERENT THINGS: NEARLY EVERY DAY
GAD7 TOTAL SCORE: 13
IF YOU CHECKED OFF ANY PROBLEMS ON THIS QUESTIONNAIRE, HOW DIFFICULT HAVE THESE PROBLEMS MADE IT FOR YOU TO DO YOUR WORK, TAKE CARE OF THINGS AT HOME, OR GET ALONG WITH OTHER PEOPLE: VERY DIFFICULT
2. NOT BEING ABLE TO STOP OR CONTROL WORRYING: NEARLY EVERY DAY
4. TROUBLE RELAXING: NEARLY EVERY DAY
7. FEELING AFRAID AS IF SOMETHING AWFUL MIGHT HAPPEN: NOT AT ALL

## 2018-06-06 NOTE — MR AVS SNAPSHOT
After Visit Summary   6/6/2018    Umberto Narvaez    MRN: [id number]           Patient Information     Date Of Birth          1970        Visit Information        Provider Department      6/6/2018 12:15 PM Marjorie Hunter APRN [facility]        Today's Diagnoses     Major depressive disorder, recurrent episode, moderate (H)    -  1    STEFANIA (generalized anxiety disorder)          Care Instructions    Treatment Plan:    Continue duloxetine (Cymbalta) 60 mg daily and bupropion (Wellbutrin)  mg in the morning.     Continue individual therapy.     Follow up with primary care provider in 1-2 months. Patient is aware of my departure from this clinic.     - Recommend patient discuss medications with their pharmacist. Risks and benefits for medications were discussed including, but not limited to, side effects.   - Safety plan was reviewed; to the ER as needed or call after hours crisis line; [phone]  - Education and counseling was done regarding use of medications, psychotherapy options  - Call [phone] for appointment or to speak to a nurse.    -Office hours: Monday through Thursday 8:00 am to 4:30 pm.             Follow-ups after your visit        Who to contact     If you have questions or need follow up information about today's clinic visit or your schedule please contact [facility] directly at [phone].  Normal or non-critical lab and imaging results will be communicated to you by MyChart, letter or phone within 4 business days after the clinic has received the results. If you do not hear from us within 7 days, please contact the clinic through MyChart or phone. If you have a critical or abnormal lab result, we will notify you by phone as soon as possible.  Submit refill requests through [employer] or call your pharmacy and they will forward the refill request to us. Please allow 3 business days for your refill to be completed.           Additional Information About Your Visit        MyChart Information     [employer] gives you secure access to your electronic health record. If you see a primary care provider, you can also send messages to your care team and make appointments. If you have questions, please call your primary care clinic.  If you do not have a primary care provider, please call [phone] and they will assist you.        Care EveryWhere ID     This is your Care EveryWhere ID. This could be used by other organizations to access your [place] medical records  [id number]        Your Vitals Were     Pulse Temperature BMI (Body Mass Index)             66 98.5  F (36.9  C) (Tympanic) 49.3 kg/m2          Blood Pressure from Last 3 Encounters:   06/06/18 125/78   04/17/18 146/72   02/22/18 112/77    Weight from Last 3 Encounters:   06/06/18 (!) 351 lb (159.2 kg)   04/17/18 (!) 351 lb (159.2 kg)   12/27/17 (!) 349 lb (158.3 kg)              Today, you had the following     No orders found for display       Primary Care Provider Office Phone # Fax #    Akashada Lio Tolliver [phone] [address] [facility] [address]        Equal Access to Services     RADHA BURCIAGA : Hadii vita ku hadasho Soomaali, waaxda luqadaha, qaybta kaalmada adeegyada, waxay vianey duncan. So [facility] [phone].    ATENCIÓN: Si habla español, tiene a clemons disposición servicios gratuitos de asistencia lingüística. Llame al [phone].    We comply with applicable federal civil rights laws and [place] laws. We do not discriminate on the basis of race, color, national origin, age, disability, sex, sexual orientation, or gender identity.            Thank you!     Thank you for choosing [facility]  for your care. Our goal is always to provide you with excellent care. Hearing back from our patients is one way we can continue to improve our services. Please take a few minutes to complete the written survey that  you may receive in the mail after your visit with us. Thank you!             Your Updated Medication List - Protect others around you: Learn how to safely use, store and throw away your medicines at www.disposemymeds.org.          This list is accurate as of 6/6/18 12:44 PM.  Always use your most recent med list.                   Brand Name Dispense Instructions for use Diagnosis    buPROPion 300 MG 24 hr tablet    WELLBUTRIN XL    30 tablet    Take 1 tablet (300 mg) by mouth every morning    Major depressive disorder, recurrent episode, mild (H)       DULoxetine 60 MG EC capsule    CYMBALTA    30 capsule    Take 1 capsule (60 mg) by mouth daily Call today to schedule an appointment [phone]    Major depressive disorder, recurrent episode, moderate (H), STEFANIA (generalized anxiety disorder)       * order for DME      Respironics BiPAP IPAP 18 cm H2O EPAP 12 cm H2O Lifetime need and heated humidity.    Obstructive sleep apnea (adult) (pediatric), Sleep related hypoventilation/hypoxemia in conditions classifiable elsewhere       * order for DME      Overnight Oximetry on BiPap to be done After 7/26/2012    Obstructive sleep apnea (adult) (pediatric), Sleep related hypoventilation/hypoxemia in conditions classifiable elsewhere       * Notice:  This list has 2 medication(s) that are the same as other medications prescribed for you. Read the directions carefully, and ask your doctor or other care provider to review them with you.

## 2018-06-06 NOTE — PATIENT INSTRUCTIONS
Treatment Plan:    Continue duloxetine (Cymbalta) 60 mg daily and bupropion (Wellbutrin)  mg in the morning.     Continue individual therapy.     Follow up with primary care provider in 1-2 months. Patient is aware of my departure from this clinic.     - Recommend patient discuss medications with their pharmacist. Risks and benefits for medications were discussed including, but not limited to, side effects.   - Safety plan was reviewed; to the ER as needed or call after hours crisis line; [phone]  - Education and counseling was done regarding use of medications, psychotherapy options  - Call [phone] for appointment or to speak to a nurse.    -Office hours: Monday through Thursday 8:00 am to 4:30 pm.

## 2018-06-06 NOTE — PROGRESS NOTES
"      Psychiatric Progress Note    Name: Umberto Narvaez  Date: 6/6/2018  Length of Visit: Spent 30 minutes face to face with this patient, where at least 50 % of this time was spent in counseling on/or about positive thinking.     MRN: [id number]      Current Outpatient Prescriptions   Medication Sig     buPROPion (WELLBUTRIN XL) 300 MG 24 hr tablet Take 1 tablet (300 mg) by mouth every morning     DULoxetine (CYMBALTA) 60 MG EC capsule Take 1 capsule (60 mg) by mouth daily Call today to schedule an appointment [phone]     ORDER FOR DME Respironics BiPAP  IPAP 18 cm H2O EPAP 12 cm H2O  Lifetime need and heated humidity.         ORDER FOR DME Overnight Oximetry on BiPap to be done After 7/26/2012       No current facility-administered medications for this visit.        Therapist:  Rakan Herrera, weekly    PHQ-9:  PHQ-9 score:    PHQ-9 SCORE 6/6/2018   Total Score -   Total Score 16       STEFANIA-7:  STEFANIA-7 SCORE 11/3/2017 12/27/2017 4/17/2018   Total Score 16 14 14           Interim History:  Patient returns for follow up from appointment 4-. Duloxetine (Cymbalta) 60 mg daily and bupropion (Wellbutrin)  mg in the morning were continued.     Patient reports filing bankruptcy which will be good \"eventually\", but the paperwork has been daunting. Patient reports he should have done the paperwork 6 weeks ago, rather has been \"ignoring it\". Patient reports others have been pushing him to complete the paperwork which has not been helpful. Patient is able to reframe the negative thoughts regarding the bankruptcy.     Patient continues to work part time at [employer] and states \"I have to change that pretty soon\" by looking for a different job. Patient has sent out 2 resumes so far. Patient continues to meet with a therapist who is helping with increasing positive thoughts.     Patient reports the medications are needed and \"I would really hate to see myself without them\".     Past Medical History: " "  Diagnosis Date     Depression        10 point ROS is negative except for those listed above.     Vital Signs:   /78 (BP Location: Right arm, Patient Position: Sitting, Cuff Size: Adult Large)  Pulse 66  Temp 98.5  F (36.9  C) (Tympanic)  Wt (!) 351 lb (159.2 kg)  BMI 49.3 kg/m2      Mental Status Assessment:  Appearance:  Well groomed      Behavior/relationship to examiner/demeanor:  Cooperative, engaged and pleasant  Motor activity:  Normal  Gait:  Normal   Speech:  Normal in volume, articulation, coherence   Mood (subjective report):  \"Wonderful\" said very sarcastically  Affect (objective appearance):  Mood congruent  Thought Process (Associations):  Logical, linear and goal directed  Thought content:  No evidence of suicidal or homicidal ideation,          no overt psychosis and                    patient does not appear to be responding to internal stimuli  Oriented to person, place, date/time   Attention Span and concentration: Intact   Memory:  Short-term memory intact and Long-term memory; Intact  Language:  Fluent   Fund of Knowledge/Intelligence:  Average  Use of language: Intact   Abstraction:  Normal  Insight:  Adequate  Judgment:  Adequate for safety    DSM DIAGNOSIS:  296.32 (F33.1) Major Depressive Disorder, Recurrent Episode, Moderate _ and With anxious distress  300.02 (F41.1) Generalized Anxiety Disorder     Assessment:  Patient is stable on current medications. Patient continues to work on progressing towards goals of financial/housing independence.    Medication side effects and alternatives were reviewed.     Treatment Plan:    Continue duloxetine (Cymbalta) 60 mg daily and bupropion (Wellbutrin)  mg in the morning.     Continue individual therapy.     Follow up with primary care provider in 1-2 months. Patient is aware of my departure from this clinic.     - Recommend patient discuss medications with their pharmacist. Risks and benefits for medications were discussed including, " but not limited to, side effects.   - Safety plan was reviewed; to the ER as needed or call after hours crisis line; [phone]  - Education and counseling was done regarding use of medications, psychotherapy options  - Call [phone] for appointment or to speak to a nurse.    -Office hours: Monday through Thursday 8:00 am to 4:30 pm.   - Patient received a copy of this Treatment Plan today.    The patient is being returned to the referring provider for ongoing care and medication prescribing.  The patient can be referred back to this service for further consultation as needed.      Signed:  Marjorie Hunter RN, MS, CNS-BC

## 2018-06-07 ASSESSMENT — PATIENT HEALTH QUESTIONNAIRE - PHQ9: SUM OF ALL RESPONSES TO PHQ QUESTIONS 1-9: 16

## 2018-06-07 ASSESSMENT — ANXIETY QUESTIONNAIRES: GAD7 TOTAL SCORE: 13

## 2018-10-12 ENCOUNTER — MYC REFILL (OUTPATIENT)
Dept: PSYCHIATRY | Facility: CLINIC | Age: 48
End: 2018-10-12

## 2018-10-12 DIAGNOSIS — F33.0 MAJOR DEPRESSIVE DISORDER, RECURRENT EPISODE, MILD (H): ICD-10-CM

## 2018-10-12 DIAGNOSIS — F33.1 MAJOR DEPRESSIVE DISORDER, RECURRENT EPISODE, MODERATE (H): ICD-10-CM

## 2018-10-12 DIAGNOSIS — F41.1 GAD (GENERALIZED ANXIETY DISORDER): ICD-10-CM

## 2018-10-12 RX ORDER — BUPROPION HYDROCHLORIDE 300 MG/1
300 TABLET ORAL EVERY MORNING
Qty: 30 TABLET | Refills: 3 | OUTPATIENT
Start: 2018-10-12

## 2018-10-12 RX ORDER — DULOXETIN HYDROCHLORIDE 60 MG/1
60 CAPSULE, DELAYED RELEASE ORAL DAILY
Qty: 30 CAPSULE | Refills: 3 | OUTPATIENT
Start: 2018-10-12

## 2018-10-12 NOTE — TELEPHONE ENCOUNTER
Medication refill denied.  Writer called pt and informed that he needs to call his PCP.      Fercho Oviedo, RN  Care Coordinator   [facility]

## 2018-10-12 NOTE — TELEPHONE ENCOUNTER
Message from [employer]:  Original authorizing provider: REHANA Godoy CNS    Umberto Narvaez would like a refill of the following medications:  DULoxetine (CYMBALTA) 60 MG EC capsule [REHANA Godoy CNS]  buPROPion (WELLBUTRIN XL) 300 MG 24 hr tablet [REHANA Godoy]    Preferred pharmacy: [facility] - [address]    Comment:  I use [facility] in [place], [place].

## 2018-10-15 ENCOUNTER — OFFICE VISIT (OUTPATIENT)
Dept: FAMILY MEDICINE | Facility: CLINIC | Age: 48
End: 2018-10-15
Payer: COMMERCIAL

## 2018-10-15 VITALS
TEMPERATURE: 96.7 F | RESPIRATION RATE: 18 BRPM | HEART RATE: 97 BPM | DIASTOLIC BLOOD PRESSURE: 68 MMHG | SYSTOLIC BLOOD PRESSURE: 124 MMHG | WEIGHT: 315 LBS | BODY MASS INDEX: 45.1 KG/M2 | HEIGHT: 70 IN | OXYGEN SATURATION: 96 %

## 2018-10-15 DIAGNOSIS — F41.1 GAD (GENERALIZED ANXIETY DISORDER): ICD-10-CM

## 2018-10-15 DIAGNOSIS — F33.1 MAJOR DEPRESSIVE DISORDER, RECURRENT EPISODE, MODERATE (H): ICD-10-CM

## 2018-10-15 DIAGNOSIS — Z00.00 ENCOUNTER FOR ROUTINE ADULT HEALTH EXAMINATION WITHOUT ABNORMAL FINDINGS: Primary | ICD-10-CM

## 2018-10-15 LAB
CHOLEST SERPL-MCNC: 192 MG/DL
GLUCOSE SERPL-MCNC: 96 MG/DL (ref 70–99)
HDLC SERPL-MCNC: 46 MG/DL
LDLC SERPL CALC-MCNC: 126 MG/DL
NONHDLC SERPL-MCNC: 146 MG/DL
TRIGL SERPL-MCNC: 102 MG/DL

## 2018-10-15 PROCEDURE — 82947 ASSAY GLUCOSE BLOOD QUANT: CPT | Performed by: FAMILY MEDICINE

## 2018-10-15 PROCEDURE — 36415 COLL VENOUS BLD VENIPUNCTURE: CPT | Performed by: FAMILY MEDICINE

## 2018-10-15 PROCEDURE — 99396 PREV VISIT EST AGE 40-64: CPT | Performed by: FAMILY MEDICINE

## 2018-10-15 PROCEDURE — 80061 LIPID PANEL: CPT | Performed by: FAMILY MEDICINE

## 2018-10-15 RX ORDER — BUPROPION HYDROCHLORIDE 300 MG/1
300 TABLET ORAL EVERY MORNING
Qty: 90 TABLET | Refills: 3 | Status: SHIPPED | OUTPATIENT
Start: 2018-10-15 | End: 2019-12-11

## 2018-10-15 RX ORDER — DULOXETIN HYDROCHLORIDE 60 MG/1
60 CAPSULE, DELAYED RELEASE ORAL DAILY
Qty: 90 CAPSULE | Refills: 3 | Status: SHIPPED | OUTPATIENT
Start: 2018-10-15 | End: 2019-12-11

## 2018-10-15 ASSESSMENT — PATIENT HEALTH QUESTIONNAIRE - PHQ9: 5. POOR APPETITE OR OVEREATING: MORE THAN HALF THE DAYS

## 2018-10-15 ASSESSMENT — ANXIETY QUESTIONNAIRES
IF YOU CHECKED OFF ANY PROBLEMS ON THIS QUESTIONNAIRE, HOW DIFFICULT HAVE THESE PROBLEMS MADE IT FOR YOU TO DO YOUR WORK, TAKE CARE OF THINGS AT HOME, OR GET ALONG WITH OTHER PEOPLE: SOMEWHAT DIFFICULT
5. BEING SO RESTLESS THAT IT IS HARD TO SIT STILL: NOT AT ALL
GAD7 TOTAL SCORE: 12
7. FEELING AFRAID AS IF SOMETHING AWFUL MIGHT HAPPEN: MORE THAN HALF THE DAYS
1. FEELING NERVOUS, ANXIOUS, OR ON EDGE: SEVERAL DAYS
2. NOT BEING ABLE TO STOP OR CONTROL WORRYING: MORE THAN HALF THE DAYS
6. BECOMING EASILY ANNOYED OR IRRITABLE: NEARLY EVERY DAY
3. WORRYING TOO MUCH ABOUT DIFFERENT THINGS: MORE THAN HALF THE DAYS

## 2018-10-15 NOTE — MR AVS SNAPSHOT
After Visit Summary   10/15/2018    Umberto Narvaez    MRN: [id number]           Patient Information     Date Of Birth          1970        Visit Information        Provider Department      10/15/2018 8:40 AM Hood Tolliver MD [facility]        Today's Diagnoses     Encounter for routine adult health examination without abnormal findings    -  1    Major depressive disorder, recurrent episode, mild (H)        Major depressive disorder, recurrent episode, moderate (H)        STEFANIA (generalized anxiety disorder)          Care Instructions      Preventive Health Recommendations  Male Ages 40 to 49    Yearly exam:             See your health care provider every year in order to  o   Review health changes.   o   Discuss preventive care.    o   Review your medicines if your doctor has prescribed any.    You should be tested each year for STDs (sexually transmitted diseases) if you re at risk.     Have a cholesterol test every 5 years.     Have a colonoscopy (test for colon cancer) if someone in your family has had colon cancer or polyps before age 50.     After age 45, have a diabetes test (fasting glucose). If you are at risk for diabetes, you should have this test every 3 years.      Talk with your health care provider about whether or not a prostate cancer screening test (PSA) is right for you.    Shots: Get a flu shot each year. Get a tetanus shot every 10 years.     Nutrition:    Eat at least 5 servings of fruits and vegetables daily.     Eat whole-grain bread, whole-wheat pasta and brown rice instead of white grains and rice.     Get adequate Calcium and Vitamin D.     Lifestyle    Exercise for at least 150 minutes a week (30 minutes a day, 5 days a week). This will help you control your weight and prevent disease.     Limit alcohol to one drink per day.     No smoking.     Wear sunscreen to prevent skin cancer.     See your dentist every six months for an exam and cleaning.  "             Follow-ups after your visit        Who to contact     If you have questions or need follow up information about today's clinic visit or your schedule please contact [facility] directly at [phone].  Normal or non-critical lab and imaging results will be communicated to you by MyChart, letter or phone within 4 business days after the clinic has received the results. If you do not hear from us within 7 days, please contact the clinic through [employer] or phone. If you have a critical or abnormal lab result, we will notify you by phone as soon as possible.  Submit refill requests through [employer] or call your pharmacy and they will forward the refill request to us. Please allow 3 business days for your refill to be completed.          Additional Information About Your Visit        [employer] Information     [employer] gives you secure access to your electronic health record. If you see a primary care provider, you can also send messages to your care team and make appointments. If you have questions, please call your primary care clinic.  If you do not have a primary care provider, please call [phone] and they will assist you.        Care EveryWhere ID     This is your Care EveryWhere ID. This could be used by other organizations to access your [facility] medical records  [id number]        Your Vitals Were     Pulse Temperature Respirations Height Pulse Oximetry BMI (Body Mass Index)    97 96.7  F (35.9  C) (Tympanic) 18 5' 10.25\" (1.784 m) 96% 48.87 kg/m2       Blood Pressure from Last 3 Encounters:   10/15/18 124/68   06/06/18 125/78   04/17/18 146/72    Weight from Last 3 Encounters:   10/15/18 (!) 343 lb (155.6 kg)   06/06/18 (!) 351 lb (159.2 kg)   04/17/18 (!) 351 lb (159.2 kg)              We Performed the Following     Glucose     Lipid panel reflex to direct LDL Fasting          Today's Medication Changes          These changes are accurate as of 10/15/18  9:18 AM.  If you have any " questions, ask your nurse or doctor.               These medicines have changed or have updated prescriptions.        Dose/Directions    DULoxetine 60 MG EC capsule   Commonly known as:  CYMBALTA   This may have changed:  additional instructions   Used for:  Major depressive disorder, recurrent episode, moderate (H), STEFANIA (generalized anxiety disorder)   Changed by:  Hood Tolliver MD        Dose:  60 mg   Take 1 capsule (60 mg) by mouth daily   Quantity:  90 capsule   Refills:  3            Where to get your medicines      These medications were sent to [facility] [place] [facility], [place] - [address] [address]  [address] [facility] [address]     Phone:  [phone]     buPROPion 300 MG 24 hr tablet    DULoxetine 60 MG EC capsule                Primary Care Provider Office Phone # Fax #    Hood Tolliver [phone] [address] [address]        Equal Access to Services     [facility] : Hadii vita olivarez hadasho Soomaali, waaxda luqadaha, qaybta kaalmada adeegyada, waxay peacein hayelin shruthi epps . So [facility] [phone].    ATENCIÓN: Si habla español, tiene a clemons disposición servicios gratuitos de asistencia lingüística. [facility] [phone].    We comply with applicable federal civil rights laws and [place] laws. We do not discriminate on the basis of race, color, national origin, age, disability, sex, sexual orientation, or gender identity.            Thank you!     Thank you for choosing [facility]  for your care. Our goal is always to provide you with excellent care. Hearing back from our patients is one way we can continue to improve our services. Please take a few minutes to complete the written survey that you may receive in the mail after your visit with us. Thank you!             Your Updated Medication List - Protect others around you: Learn how to safely use, store and throw away your medicines at  www.disposemymeds.org.          This list is accurate as of 10/15/18  9:18 AM.  Always use your most recent med list.                   Brand Name Dispense Instructions for use Diagnosis    buPROPion 300 MG 24 hr tablet    WELLBUTRIN XL    90 tablet    Take 1 tablet (300 mg) by mouth every morning    Major depressive disorder, recurrent episode, mild (H)       DULoxetine 60 MG EC capsule    CYMBALTA    90 capsule    Take 1 capsule (60 mg) by mouth daily    Major depressive disorder, recurrent episode, moderate (H), STEFANIA (generalized anxiety disorder)       * order for DME      Respironics BiPAP IPAP 18 cm H2O EPAP 12 cm H2O Lifetime need and heated humidity.    Obstructive sleep apnea (adult) (pediatric), Sleep related hypoventilation/hypoxemia in conditions classifiable elsewhere       * order for DME      Overnight Oximetry on BiPap to be done After 7/26/2012    Obstructive sleep apnea (adult) (pediatric), Sleep related hypoventilation/hypoxemia in conditions classifiable elsewhere       * Notice:  This list has 2 medication(s) that are the same as other medications prescribed for you. Read the directions carefully, and ask your doctor or other care provider to review them with you.

## 2018-10-15 NOTE — LETTER
October 15, 2018      Umbertolien Narvaez  [address]  [facility] [address]        Dear ,    We are writing to inform you of your test results.    Your test results were within normal parameters.     Resulted Orders   Lipid panel reflex to direct LDL Fasting   Result Value Ref Range    Cholesterol 192 <200 mg/dL    Triglycerides 102 <150 mg/dL      Comment:      Fasting specimen    HDL Cholesterol 46 >39 mg/dL    LDL Cholesterol Calculated 126 (H) <100 mg/dL      Comment:      Above desirable:  100-129 mg/dl  Borderline High:  130-159 mg/dL  High:             160-189 mg/dL  Very high:       >189 mg/dl      Non HDL Cholesterol 146 (H) <130 mg/dL      Comment:      Above Desirable:  130-159 mg/dl  Borderline high:  160-189 mg/dl  High:             190-219 mg/dl  Very high:       >219 mg/dl     Glucose   Result Value Ref Range    Glucose 96 70 - 99 mg/dL      Comment:      Fasting specimen       If you have any questions or concerns, please call the clinic at the number listed above.       Sincerely,        Hood Tolliver MD

## 2018-10-15 NOTE — PROGRESS NOTES
Please inform patient that test result was within normal parameters.   Thank you.     Hood Tolliver M.D.

## 2018-10-15 NOTE — PROGRESS NOTES
SUBJECTIVE:   CC: Umberto Narvaez is an 48 year old male who presents for preventative health visit.   Chief Complaint   Patient presents with     Physical     Depression     Refill Request     Blood Draw     Patient is fasting        48 yr old male here for his annual physical and refills of his medication. He reports that his depression has improved greatly and he has a job now which has really helped. He was in counseling before but stopped a few months ago. He did say it helped and he will like to get back into counseling. Denies any suicidal ideations.     Healthy Habits:    Do you get at least three servings of calcium containing foods daily (dairy, green leafy vegetables, etc.)? yes    Amount of exercise or daily activities, outside of work: none     Problems taking medications regularly No    Medication side effects: No    Have you had an eye exam in the past two years? Unsure     Do you see a dentist twice per year? yes    Do you have sleep apnea, excessive snoring or daytime drowsiness?yes c-pap       Medication Followup of all medication listed in       Taking Medication as prescribed: yes    Side Effects:  None    Medication Helping Symptoms:  yes     Depression and Anxiety Follow-Up    Status since last visit: Improved with the medication    Other associated symptoms:None    Complicating factors:     Significant life event: No     Current substance abuse: None    PHQ 4/17/2018 6/6/2018 10/15/2018   PHQ-9 Total Score 15 16 14   Q9: Suicide Ideation Not at all Not at all Not at all     STEFANIA-7 SCORE 4/17/2018 6/6/2018 10/15/2018   Total Score 14 13 12     In the past two weeks have you had thoughts of suicide or self-harm?  No.    Do you have concerns about your personal safety or the safety of others?   No  PHQ-9  English  PHQ-9   Any Language  STEFANIA-7  Suicide Assessment Five-step Evaluation and Treatment (SAFE-T)    Today's PHQ-2 Score:   PHQ-2 ( 1999 [employer]) 10/15/2018 4/17/2017   Q1: Little  interest or pleasure in doing things 2 3   Q2: Feeling down, depressed or hopeless 3 3   PHQ-2 Score 5 6       Abuse: Current or Past(Physical, Sexual or Emotional)- No  Do you feel safe in your environment - Yes    Social History   Substance Use Topics     Smoking status: Never Smoker     Smokeless tobacco: Never Used     Alcohol use Yes      Comment: 1/week      If you drink alcohol do you typically have >3 drinks per day or >7 drinks per week? No                      Last PSA:   PSA   Date Value Ref Range Status   04/17/2017 0.71 0 - 4 ug/L Final     Comment:     Assay Method:  Chemiluminescence using Siemens Vista analyzer       Reviewed orders with patient. Reviewed health maintenance and updated orders accordingly - Yes  Labs reviewed in EPIC  BP Readings from Last 3 Encounters:   10/15/18 124/68   06/06/18 125/78   04/17/18 146/72    Wt Readings from Last 3 Encounters:   10/15/18 (!) 343 lb (155.6 kg)   06/06/18 (!) 351 lb (159.2 kg)   04/17/18 (!) 351 lb (159.2 kg)                  Patient Active Problem List   Diagnosis     Obesity     HYPERLIPIDEMIA LDL GOAL <160     Mild recurrent major depression (H)     Sleep related hypoventilation/hypoxemia in other disease     LAURA (obstructive sleep apnea)     Ankle sprain     Edema     Past Surgical History:   Procedure Laterality Date     SURGICAL HISTORY OF -   10/09    lasic     TONSILLECTOMY         Social History   Substance Use Topics     Smoking status: Never Smoker     Smokeless tobacco: Never Used     Alcohol use Yes      Comment: 1/week     Family History   Problem Relation Age of Onset     Family History Negative Mother      HEART DISEASE Father      heart murmur     Depression Father      Family History Negative Sister      Cerebrovascular Disease Paternal Grandfather      Diabetes No family hx of          Current Outpatient Prescriptions   Medication Sig Dispense Refill     buPROPion (WELLBUTRIN XL) 300 MG 24 hr tablet Take 1 tablet (300 mg) by mouth  "every morning 90 tablet 3     DULoxetine (CYMBALTA) 60 MG EC capsule Take 1 capsule (60 mg) by mouth daily 90 capsule 3     ORDER FOR DME Respironics BiPAP  IPAP 18 cm H2O EPAP 12 cm H2O  Lifetime need and heated humidity.           ORDER FOR DME Overnight Oximetry on BiPap to be done After 7/26/2012         [DISCONTINUED] buPROPion (WELLBUTRIN XL) 300 MG 24 hr tablet Take 1 tablet (300 mg) by mouth every morning 30 tablet 3     [DISCONTINUED] DULoxetine (CYMBALTA) 60 MG EC capsule Take 1 capsule (60 mg) by mouth daily Call today to schedule an appointment [phone] 30 capsule 3     No Known Allergies    Reviewed and updated as needed this visit by clinical staff  Tobacco  Allergies  Meds  Med Hx  Surg Hx  Fam Hx  Soc Hx        Reviewed and updated as needed this visit by Provider        Past Medical History:   Diagnosis Date     Depression       Past Surgical History:   Procedure Laterality Date     SURGICAL HISTORY OF -   10/09    lasic     TONSILLECTOMY         ROS:  CONSTITUTIONAL: NEGATIVE for fever, chills, change in weight  INTEGUMENTARY/SKIN: NEGATIVE for worrisome rashes, moles or lesions  EYES: NEGATIVE for vision changes or irritation  ENT: NEGATIVE for ear, mouth and throat problems  RESP: NEGATIVE for significant cough or SOB  CV: NEGATIVE for chest pain, palpitations or peripheral edema  GI: NEGATIVE for nausea, abdominal pain, heartburn, or change in bowel habits   male: negative for dysuria, hematuria, decreased urinary stream, erectile dysfunction, urethral discharge  MUSCULOSKELETAL: NEGATIVE for significant arthralgias or myalgia  NEURO: NEGATIVE for weakness, dizziness or paresthesias  PSYCHIATRIC: NEGATIVE for changes in mood or affect    OBJECTIVE:   /68  Pulse 97  Temp 96.7  F (35.9  C) (Tympanic)  Resp 18  Ht 5' 10.25\" (1.784 m)  Wt (!) 343 lb (155.6 kg)  SpO2 96%  BMI 48.87 kg/m2  EXAM:  GENERAL: healthy, alert and no distress  EYES: Eyes grossly normal to inspection, " "PERRL and conjunctivae and sclerae normal  HENT: ear canals and TM's normal, nose and mouth without ulcers or lesions  NECK: no adenopathy, no asymmetry, masses, or scars and thyroid normal to palpation  RESP: lungs clear to auscultation - no rales, rhonchi or wheezes  CV: regular rate and rhythm, normal S1 S2, no S3 or S4, no murmur, click or rub, no peripheral edema and peripheral pulses strong  ABDOMEN: soft, nontender, no hepatosplenomegaly, no masses and bowel sounds normal  MS: no gross musculoskeletal defects noted, no edema  SKIN: no suspicious lesions or rashes  PSYCH: mentation appears normal, affect normal/bright    Diagnostic Test Results:  Pending     ASSESSMENT/PLAN:   (Z00.00) Encounter for routine adult health examination without abnormal findings  (primary encounter diagnosis)  Comment: Patient will be notified of results  Plan: Lipid panel reflex to direct LDL Fasting,         Glucose      (F33.1) Major depressive disorder, recurrent episode, moderate (H)  Comment: Medication refilled  Plan: buPROPion (WELLBUTRIN XL) 300 MG 24 hr tablet,         DULoxetine (CYMBALTA) 60 MG EC capsule            (F41.1) STEFANIA (generalized anxiety disorder)  Comment: medication refilled   Plan: DULoxetine (CYMBALTA) 60 MG EC capsule              COUNSELING:  Reviewed preventive health counseling, as reflected in patient instructions       Regular exercise       Healthy diet/nutrition       Vision screening    BP Readings from Last 1 Encounters:   10/15/18 124/68     Estimated body mass index is 48.87 kg/(m^2) as calculated from the following:    Height as of this encounter: 5' 10.25\" (1.784 m).    Weight as of this encounter: 343 lb (155.6 kg).      Weight management plan: Discussed healthy diet and exercise guidelines and patient will follow up in 12 months in clinic to re-evaluate.     reports that he has never smoked. He has never used smokeless tobacco.      Counseling Resources:  ATP IV Guidelines  Pooled Cohorts " Equation Calculator  FRAX Risk Assessment  ICSI Preventive Guidelines  Dietary Guidelines for Americans, 2010  USDA's MyPlate  ASA Prophylaxis  Lung CA Screening    Hood Tolliver MD  [facility]

## 2018-10-16 ASSESSMENT — ANXIETY QUESTIONNAIRES: GAD7 TOTAL SCORE: 12

## 2018-10-16 ASSESSMENT — PATIENT HEALTH QUESTIONNAIRE - PHQ9: SUM OF ALL RESPONSES TO PHQ QUESTIONS 1-9: 14

## 2018-10-22 PROBLEM — F33.1 MAJOR DEPRESSIVE DISORDER, RECURRENT EPISODE, MODERATE (H): Status: ACTIVE | Noted: 2018-10-22

## 2018-10-22 PROBLEM — F41.1 GAD (GENERALIZED ANXIETY DISORDER): Status: ACTIVE | Noted: 2018-10-22

## 2018-12-24 ENCOUNTER — HOSPITAL ENCOUNTER (EMERGENCY)
Facility: CLINIC | Age: 48
Discharge: HOME | End: 2018-12-24
Attending: FAMILY MEDICINE | Admitting: FAMILY MEDICINE
Payer: COMMERCIAL

## 2018-12-24 VITALS
SYSTOLIC BLOOD PRESSURE: 160 MMHG | DIASTOLIC BLOOD PRESSURE: 84 MMHG | WEIGHT: 315 LBS | TEMPERATURE: 98 F | RESPIRATION RATE: 18 BRPM | BODY MASS INDEX: 44.1 KG/M2 | HEIGHT: 71 IN | OXYGEN SATURATION: 99 %

## 2018-12-24 DIAGNOSIS — J20.9 ACUTE BRONCHITIS, UNSPECIFIED ORGANISM: ICD-10-CM

## 2018-12-24 PROCEDURE — 99282 EMERGENCY DEPT VISIT SF MDM: CPT | Mod: Z6 | Performed by: FAMILY MEDICINE

## 2018-12-24 PROCEDURE — 99282 EMERGENCY DEPT VISIT SF MDM: CPT | Performed by: FAMILY MEDICINE

## 2018-12-24 RX ORDER — AZITHROMYCIN 250 MG/1
TABLET, FILM COATED ORAL
Qty: 6 TABLET | Refills: 0 | Status: SHIPPED | OUTPATIENT
Start: 2018-12-24 | End: 2018-12-29

## 2018-12-24 RX ORDER — CODEINE PHOSPHATE AND GUAIFENESIN 10; 100 MG/5ML; MG/5ML
1-2 SOLUTION ORAL
Qty: 120 ML | Refills: 0 | Status: SHIPPED | OUTPATIENT
Start: 2018-12-24 | End: 2019-01-23

## 2018-12-24 ASSESSMENT — MIFFLIN-ST. JEOR: SCORE: 2429.36

## 2018-12-24 NOTE — DISCHARGE INSTRUCTIONS
Be seen if not resolved in 2-3 weeks or if new concerning symptoms develop.  You may use codeine cough syrup 1-2 teaspoons before bed if needed for cough.

## 2018-12-24 NOTE — ED PROVIDER NOTES
History     Chief Complaint   Patient presents with     URI     for 3 weeks, woke up worse today     HPI  Umberto Narvaez is a 48 year old male who presents with a cough.  These have been present for 3 weeks.  He has no shortness of breath, wheezing, fevers.  He has felt hot.  The cough keeps him up at night.  He has no history of asthma or chronic lung disease.  He does not smoke.  He has obesity and depression.  He is on Wellbutrin and Cymbalta.  He has no identified medication allergies.  His nasal congestion and rhinorrhea.    Problem List:    Patient Active Problem List    Diagnosis Date Noted     STEFANIA (generalized anxiety disorder) 10/22/2018     Priority: Medium     Major depressive disorder, recurrent episode, moderate (H) 10/22/2018     Priority: Medium     Ankle sprain 09/05/2012     Priority: Medium     Edema 09/05/2012     Priority: Medium     Sleep related hypoventilation/hypoxemia in other disease 08/10/2012     Priority: Medium     Problem list name updated by automated process. Provider to review       LAURA (obstructive sleep apnea) 08/10/2012     Priority: Medium     , desats to 74%       Mild recurrent major depression (H) 03/26/2012     Priority: Medium     HYPERLIPIDEMIA LDL GOAL <160 10/31/2010     Priority: Medium     Obesity 04/10/2008     Priority: Medium     Problem list name updated by automated process. Provider to review          Past Medical History:    Past Medical History:   Diagnosis Date     Depression        Past Surgical History:    Past Surgical History:   Procedure Laterality Date     SURGICAL HISTORY OF -   10/09    lasic     TONSILLECTOMY         Family History:    Family History   Problem Relation Age of Onset     Family History Negative Mother      Heart Disease Father         heart murmur     Depression Father      Family History Negative Sister      Cerebrovascular Disease Paternal Grandfather      Diabetes No family hx of        Social History:  Marital Status:   "Single [1]  Social History     Tobacco Use     Smoking status: Never Smoker     Smokeless tobacco: Never Used   Substance Use Topics     Alcohol use: Yes     Comment: 1/week     Drug use: No        Medications:      azithromycin (ZITHROMAX Z-CASTILLO) 250 MG tablet   buPROPion (WELLBUTRIN XL) 300 MG 24 hr tablet   DULoxetine (CYMBALTA) 60 MG EC capsule   guaiFENesin-codeine (ROBITUSSIN AC) 100-10 MG/5ML solution   ORDER FOR DME   ORDER FOR DME         Review of Systems  Further problem focused system review negative.    Physical Exam   BP: 160/84  Heart Rate: 64  Temp: 98  F (36.7  C)  Resp: 18  Height: 180.3 cm (5' 11\")  Weight: (!) 154.2 kg (340 lb)  SpO2: 99 %      Physical Exam  Nursing note and vitals were reviewed.  Constitutional: Awake and alert, obese but adequately nourished and developed appearing 48-year-old in no apparent discomfort, who does not appear acutely ill, and who answers questions appropriately and cooperates with examination.  HEENT: EACs are clear.  TMs are normal.   EOMI.   Neck: Freely mobile.  Pulmonary/Chest: Breathing is unlabored.  Breath sounds are clear and equal bilaterally.  There no retractions, tachypnea, rales, wheezes, or rhonchi.  Neurological: Alert, oriented, thought content logical, coherent   Skin: Warm, dry, no rashes.  Psychiatric: Affect broad and appropriate.      ED Course        Procedures               Critical Care time:  none               No results found for this or any previous visit (from the past 24 hour(s)).    Medications - No data to display    Assessments & Plan (with Medical Decision Making)     48-year-old male without any history of lung disease presents with 3 weeks of nonproductive cough without shortness of breath, chest pain, fever.  Physical examination is normal.  I discussed with him this likely represents viral bronchitis and that it may take an additional 3 weeks to resolve.  He wants to take an course of antibiotics because of the persistence of " the symptoms.  Advised him this is unlikely to help discouraged him from doing it but agreed to give him a prescription for Zithromax.  He also requested codeine cough syrup to help suppress the cough.  I explained that cough suppressants are not particularly effective but I gave him a prescription for codeine cough syrup.  I warned him about the side effects of sedation and constipation with codeine.  Advised return if he develops fever, chest pain, shortness of breath, or other new concerning symptoms.    I have reviewed the nursing notes.    I have reviewed the findings, diagnosis, plan and need for follow up with the patient.          Medication List      Started    azithromycin 250 MG tablet  Commonly known as:  ZITHROMAX Z-CASTILLO  Two tablets on the first day, then one tablet daily for the next 4 days     guaiFENesin-codeine 100-10 MG/5ML solution  Commonly known as:  ROBITUSSIN AC  1-2 tsp., Oral, AT BEDTIME PRN            Final diagnoses:   Acute bronchitis, unspecified organism       12/24/2018   [facility] EMERGENCY DEPARTMENT     Addy Roman MD  12/24/18 1614

## 2018-12-24 NOTE — ED AVS SNAPSHOT
[facility] Emergency Department  [address] [address]  Phone:  [phone]  Fax:  [phone]                                    Umberto Narvaez   MRN: [id number]    Department:  [facility] Emergency Department   Date of Visit:  12/24/2018           After Visit Summary Signature Page    I have received my discharge instructions, and my questions have been answered. I have discussed any challenges I see with this plan with the nurse or doctor.    ..........................................................................................................................................  Patient/Patient Representative Signature      ..........................................................................................................................................  Patient Representative Print Name and Relationship to Patient    ..................................................               ................................................  Date                                   Time    ..........................................................................................................................................  Reviewed by Signature/Title    ...................................................              ..............................................  Date                                               Time          22EPIC Rev 08/18

## 2018-12-24 NOTE — ED NOTES
Patient c/o chest cold for 3 weeks.  Has not seen primary care.  Nasal congestion, productive cough.  No chest pain except with cough.  No sore throat  States productive cough.  No retractions,  No increased WOB

## 2019-04-09 DIAGNOSIS — F33.1 MAJOR DEPRESSIVE DISORDER, RECURRENT EPISODE, MODERATE (H): ICD-10-CM

## 2019-04-09 DIAGNOSIS — F41.1 GAD (GENERALIZED ANXIETY DISORDER): ICD-10-CM

## 2019-04-09 RX ORDER — BUPROPION HYDROCHLORIDE 300 MG/1
300 TABLET ORAL EVERY MORNING
Qty: 90 TABLET | Refills: 3 | Status: CANCELLED | OUTPATIENT
Start: 2019-04-09

## 2019-04-09 RX ORDER — DULOXETIN HYDROCHLORIDE 60 MG/1
60 CAPSULE, DELAYED RELEASE ORAL DAILY
Qty: 90 CAPSULE | Refills: 3 | Status: CANCELLED | OUTPATIENT
Start: 2019-04-09

## 2019-04-09 NOTE — TELEPHONE ENCOUNTER
Reason for Call:  Medication or medication refill: Patient has been out of them for a few days. If patient needs to be seen he said that is fine but he is asking if he can have a refill till then.     Do you use a [facility]?  Name of the pharmacy and phone number for the current request:  [facility] [phone]    Name of the medication requested:   Bupropion 300 mg  Last Written Prescription Date:  10/15/18  Last Fill Quantity: 90,  # refills: 3   Last office visit: 10/15/2018 with prescribing provider:  Unruly Jeong Office Visit:      Duloxetine 60 mg  Last Written Prescription Date:  10/15/18  Last Fill Quantity: 90,  # refills: 3   Last office visit: 10/15/2018 with prescribing provider:  Unruly Jeong Office Visit:      Can we leave a detailed message on this number? YES    Phone number patient can be reached at: Home number on file [phone] (home)    Best Time: any    Call taken on 4/9/2019 at 2:56 PM by Phuong Comer

## 2019-04-09 NOTE — TELEPHONE ENCOUNTER
Duplicates. Sent 10/15/18 #90 X 3 refills. Called [facility] to clarify. They updated their system. Patient notified.      TIAN GrayN, RN

## 2019-05-07 ENCOUNTER — TELEPHONE (OUTPATIENT)
Dept: FAMILY MEDICINE | Facility: CLINIC | Age: 49
End: 2019-05-07

## 2019-05-07 NOTE — TELEPHONE ENCOUNTER
Panel Management Review      Patient has the following on his problem list:     Depression / Dysthymia review    Measure:  Needs PHQ-9 score of 4 or less during index window.  Administer PHQ-9 and if score is 5 or more, send encounter to provider for next steps.    4-8 month window range: 2/14/19-6/14/19    PHQ-9 SCORE 4/17/2018 6/6/2018 10/15/2018   PHQ-9 Total Score - - -   PHQ-9 Total Score 15 16 14       If PHQ-9 recheck is 5 or more, route to provider for next steps.    Patient is due for:  PHQ9      Composite cancer screening  Chart review shows that this patient is due/due soon for the following None  Summary:    Patient is due/failing the following:   PHQ9    Action needed:   Patient needs to do PHQ9.  Route to MA to review PHQ.      Questions for provider review:    None                                                                                                                                    RALPH Mccormick MA       Chart routed to Care Team .

## 2019-05-07 NOTE — LETTER
May 30, 2019      Umberto Narvaez  [address] [place]  [facility] [place], [place] [address]        Dear Umberto,     We have been unable to reach you by phone Your [facility] Team works hard to make sure that you receive exceptional care. Enclosed you will find a copy of the phq-9/gad7 depression form  that our clinic uses to monitor and manage your Depression/anxiety  This test is an assessment tool that we can use to determine how well your Depression  is controlled. Please fill out and mail back the enclosed  form. Enclosed is a stamped addressed envelope for you to mail the form  back to the clinic in.    If you are receiving your care at another facility please let us know so we can update our records.                       Sincerely,        Hood Tolliver MD

## 2019-05-09 NOTE — TELEPHONE ENCOUNTER
Panel Management Review      Patient has the following on his problem list:     Depression / Dysthymia review    Measure:  Needs PHQ-9 score of 4 or less during index window.  Administer PHQ-9 and if score is 5 or more, send encounter to provider for next steps.        PHQ-9 SCORE 4/17/2018 6/6/2018 10/15/2018   PHQ-9 Total Score - - -   PHQ-9 Total Score 15 16 14       If PHQ-9 recheck is 5 or more, route to provider for next steps.    Patient is due for:  PHQ9      Composite cancer screening  Chart review shows that this patient is due/due soon for the following None  Summary:    Patient is due/failing the following:   PHQ9      Type of outreach:    Sent Fle[employer] message. with phq-9/gad7 for patient to fill out and send to patient .                                                      Ivan HERMAN CMA

## 2019-05-14 NOTE — TELEPHONE ENCOUNTER
Panel Management Review      Patient has the following on his problem list:     Depression / Dysthymia review    Measure:  Needs PHQ-9 score of 4 or less during index window.  Administer PHQ-9 and if score is 5 or more, send encounter to provider for next steps.      PHQ-9 SCORE 4/17/2018 6/6/2018 10/15/2018   PHQ-9 Total Score - - -   PHQ-9 Total Score 15 16 14       If PHQ-9 recheck is 5 or more, route to provider for next steps.    Patient is due for:  PHQ9      Composite cancer screening  Chart review shows that this patient is due/due soon for the following None  Summary:    Patient is due/failing the following:   PHQ9    Type of outreach:    Left message for patient to call back. Ivan SPENCE

## 2019-05-30 NOTE — TELEPHONE ENCOUNTER
Panel Management Review      Patient has the following on his problem list:     Depression / Dysthymia review    Measure:  Needs PHQ-9 score of 4 or less during index window.  Administer PHQ-9 and if score is 5 or more, send encounter to provider for next steps.        PHQ-9 SCORE 4/17/2018 6/6/2018 10/15/2018   PHQ-9 Total Score - - -   PHQ-9 Total Score 15 16 14       If PHQ-9 recheck is 5 or more, route to provider for next steps.    Patient is due for:  PHQ9      Composite cancer screening  Chart review shows that this patient is due/due soon for the following None  Summary:    Patient is due/failing the following:   PHQ9      Type of outreach:    Sent letter. with copy of phq-9/gad7 for patient to fill out and mail back .                                                                             Ivan HERMAN CMA

## 2019-11-08 ENCOUNTER — HEALTH MAINTENANCE LETTER (OUTPATIENT)
Age: 49
End: 2019-11-08

## 2019-11-19 ENCOUNTER — TELEPHONE (OUTPATIENT)
Dept: FAMILY MEDICINE | Facility: CLINIC | Age: 49
End: 2019-11-19

## 2019-11-19 NOTE — TELEPHONE ENCOUNTER
Panel Management Review      Patient has the following on his problem list:     Depression / Dysthymia review    Measure:  Needs PHQ-9 score of 4 or less during index window.  Administer PHQ-9 and if score is 5 or more, send encounter to provider for next steps.    10 - 14 month window range: 8/16/19-12/14/19    PHQ-9 SCORE 4/17/2018 6/6/2018 10/15/2018   PHQ-9 Total Score - - -   PHQ-9 Total Score 15 16 14       If PHQ-9 recheck is 5 or more, route to provider for next steps.    Patient is due for:  PHQ9      Composite cancer screening  Chart review shows that this patient is due/due soon for the following Colonoscopy  Summary:    Patient is due/failing the following:   COLONOSCOPY and PHQ9    Action needed:   Patient needs to do PHQ9. and colonoscopy    Type of outreach:        Questions for provider review:    None                                                                                                                                    RALPH Mccormick MA       Chart routed to Care Team .

## 2019-11-19 NOTE — LETTER
December 4, 2019      Umberto Narvaez  [address] [place] [address]        Dear Umberto,     In order to ensure we are providing the best quality care, we have reviewed your chart and see that you are due for:  1.  An update on the depression and anxiety questionnaires.  These tools help your provider to monitor and manage your symptoms and treatment plan.  Please complete the enclosed questionnaires and send back to the clinic in the provided envelope.    2.  Colon Cancer Screening. Here is some information regarding this testing.     Recommended every 5-10 years, depending on your history, in order to prevent and detect colon cancer at its earliest stages.  Colon cancer is now the second leading cause of death in the [place] for both men and women and there are over 130,000 new cases and 50,000 deaths per year from colon cancer.  Colonoscopies can prevent 90-95% of these deaths.  Problem lesions can be removed before they ever become cancer. This test is not only looking for cancer, but also getting rid of precancerious lesions. You are usually given some sedation which makes the test very comfortable for most people.      If you do not wish to do a colonoscopy or cannot afford to do one, at this time, there is another option. It is called a FIT test or Fecal Immunochemical Occult Blood Test (take home stool sample kit).  It does not replace the colonoscopy for colorectal cancer screening, but it can detect hidden bleeding in the lower colon.  It does need to be repeated every year and if a positive result is obtained, you would be referred for a colonoscopy. The FIT test is really easy to do and does not require any  diet or medication restrictions and involves only one collection sample.      If you have completed either one of these tests or had a flexible sigmoidoscopy in the past five years at another facility, please have the records sent to our clinic so that we can best coordinate your care  and update your chart.  Please call us if you have questions or would like arrange either to do a colonoscopy or obtain the necessary test kit for the Fecal Occult Test.                Please call the clinic with any questions or concerns.    Thank you for trusting us with your health care.  Sincerely,    Your [facility] Team/joe

## 2019-12-04 NOTE — TELEPHONE ENCOUNTER
No response to phone calls, letter mailed with phq/ricardo for the patient to complete and return.  Will remind due for colonoscopy.

## 2019-12-09 ASSESSMENT — ENCOUNTER SYMPTOMS
CHILLS: 0
PARESTHESIAS: 0
FEVER: 0
NAUSEA: 0
ARTHRALGIAS: 0
DYSURIA: 0
PALPITATIONS: 0
HEADACHES: 0
JOINT SWELLING: 0
COUGH: 0
MYALGIAS: 0
ABDOMINAL PAIN: 0
WEAKNESS: 0
CONSTIPATION: 0
FREQUENCY: 0
EYE PAIN: 0
DIARRHEA: 0
NERVOUS/ANXIOUS: 1
HEARTBURN: 0
SHORTNESS OF BREATH: 0
DIZZINESS: 0
SORE THROAT: 0
HEMATURIA: 0

## 2019-12-09 ASSESSMENT — PATIENT HEALTH QUESTIONNAIRE - PHQ9
10. IF YOU CHECKED OFF ANY PROBLEMS, HOW DIFFICULT HAVE THESE PROBLEMS MADE IT FOR YOU TO DO YOUR WORK, TAKE CARE OF THINGS AT HOME, OR GET ALONG WITH OTHER PEOPLE: VERY DIFFICULT
SUM OF ALL RESPONSES TO PHQ QUESTIONS 1-9: 14
SUM OF ALL RESPONSES TO PHQ QUESTIONS 1-9: 14

## 2019-12-10 ASSESSMENT — PATIENT HEALTH QUESTIONNAIRE - PHQ9: SUM OF ALL RESPONSES TO PHQ QUESTIONS 1-9: 14

## 2019-12-11 ENCOUNTER — OFFICE VISIT (OUTPATIENT)
Dept: FAMILY MEDICINE | Facility: CLINIC | Age: 49
End: 2019-12-11
Payer: COMMERCIAL

## 2019-12-11 ENCOUNTER — TELEPHONE (OUTPATIENT)
Dept: FAMILY MEDICINE | Facility: CLINIC | Age: 49
End: 2019-12-11

## 2019-12-11 VITALS
DIASTOLIC BLOOD PRESSURE: 70 MMHG | WEIGHT: 315 LBS | RESPIRATION RATE: 18 BRPM | SYSTOLIC BLOOD PRESSURE: 110 MMHG | TEMPERATURE: 98.6 F | BODY MASS INDEX: 45.1 KG/M2 | HEIGHT: 70 IN | HEART RATE: 73 BPM | OXYGEN SATURATION: 97 %

## 2019-12-11 DIAGNOSIS — E55.9 VITAMIN D DEFICIENCY: ICD-10-CM

## 2019-12-11 DIAGNOSIS — F41.1 GAD (GENERALIZED ANXIETY DISORDER): Primary | ICD-10-CM

## 2019-12-11 DIAGNOSIS — Z00.00 ROUTINE GENERAL MEDICAL EXAMINATION AT A HEALTH CARE FACILITY: ICD-10-CM

## 2019-12-11 DIAGNOSIS — F33.1 MAJOR DEPRESSIVE DISORDER, RECURRENT EPISODE, MODERATE (H): ICD-10-CM

## 2019-12-11 DIAGNOSIS — Z12.11 SCREEN FOR COLON CANCER: ICD-10-CM

## 2019-12-11 LAB
CHOLEST SERPL-MCNC: 175 MG/DL
DEPRECATED CALCIDIOL+CALCIFEROL SERPL-MC: 13 UG/L (ref 20–75)
GLUCOSE SERPL-MCNC: 95 MG/DL (ref 70–99)
HDLC SERPL-MCNC: 46 MG/DL
LDLC SERPL CALC-MCNC: 109 MG/DL
NONHDLC SERPL-MCNC: 129 MG/DL
TRIGL SERPL-MCNC: 100 MG/DL
VIT B12 SERPL-MCNC: 423 PG/ML (ref 193–986)

## 2019-12-11 PROCEDURE — 80061 LIPID PANEL: CPT | Performed by: FAMILY MEDICINE

## 2019-12-11 PROCEDURE — 99213 OFFICE O/P EST LOW 20 MIN: CPT | Mod: 25 | Performed by: FAMILY MEDICINE

## 2019-12-11 PROCEDURE — 90682 RIV4 VACC RECOMBINANT DNA IM: CPT | Performed by: FAMILY MEDICINE

## 2019-12-11 PROCEDURE — 82607 VITAMIN B-12: CPT | Performed by: FAMILY MEDICINE

## 2019-12-11 PROCEDURE — 82306 VITAMIN D 25 HYDROXY: CPT | Performed by: FAMILY MEDICINE

## 2019-12-11 PROCEDURE — 90471 IMMUNIZATION ADMIN: CPT | Performed by: FAMILY MEDICINE

## 2019-12-11 PROCEDURE — 36415 COLL VENOUS BLD VENIPUNCTURE: CPT | Performed by: FAMILY MEDICINE

## 2019-12-11 PROCEDURE — 99396 PREV VISIT EST AGE 40-64: CPT | Mod: 25 | Performed by: FAMILY MEDICINE

## 2019-12-11 PROCEDURE — 82947 ASSAY GLUCOSE BLOOD QUANT: CPT | Performed by: FAMILY MEDICINE

## 2019-12-11 RX ORDER — DULOXETIN HYDROCHLORIDE 60 MG/1
60 CAPSULE, DELAYED RELEASE ORAL DAILY
Qty: 90 CAPSULE | Refills: 3 | Status: SHIPPED | OUTPATIENT
Start: 2019-12-11 | End: 2020-12-16

## 2019-12-11 RX ORDER — BUPROPION HYDROCHLORIDE 300 MG/1
300 TABLET ORAL EVERY MORNING
Qty: 90 TABLET | Refills: 3 | Status: SHIPPED | OUTPATIENT
Start: 2019-12-11 | End: 2020-12-16

## 2019-12-11 ASSESSMENT — ENCOUNTER SYMPTOMS
HEADACHES: 0
FEVER: 0
ARTHRALGIAS: 0
ABDOMINAL PAIN: 0
COUGH: 0
HEARTBURN: 0
EYE PAIN: 0
CHILLS: 0
DYSURIA: 0
PALPITATIONS: 0
CONSTIPATION: 0
DIARRHEA: 0
SHORTNESS OF BREATH: 0
PARESTHESIAS: 0
HEMATURIA: 0
NAUSEA: 0
DIZZINESS: 0
NERVOUS/ANXIOUS: 1
SORE THROAT: 0
FREQUENCY: 0
JOINT SWELLING: 0
MYALGIAS: 0
WEAKNESS: 0

## 2019-12-11 ASSESSMENT — ANXIETY QUESTIONNAIRES
GAD7 TOTAL SCORE: 13
5. BEING SO RESTLESS THAT IT IS HARD TO SIT STILL: NOT AT ALL
6. BECOMING EASILY ANNOYED OR IRRITABLE: MORE THAN HALF THE DAYS
2. NOT BEING ABLE TO STOP OR CONTROL WORRYING: MORE THAN HALF THE DAYS
7. FEELING AFRAID AS IF SOMETHING AWFUL MIGHT HAPPEN: SEVERAL DAYS
IF YOU CHECKED OFF ANY PROBLEMS ON THIS QUESTIONNAIRE, HOW DIFFICULT HAVE THESE PROBLEMS MADE IT FOR YOU TO DO YOUR WORK, TAKE CARE OF THINGS AT HOME, OR GET ALONG WITH OTHER PEOPLE: VERY DIFFICULT
1. FEELING NERVOUS, ANXIOUS, OR ON EDGE: NEARLY EVERY DAY
3. WORRYING TOO MUCH ABOUT DIFFERENT THINGS: NEARLY EVERY DAY

## 2019-12-11 ASSESSMENT — MIFFLIN-ST. JEOR: SCORE: 2326.27

## 2019-12-11 ASSESSMENT — PATIENT HEALTH QUESTIONNAIRE - PHQ9: 5. POOR APPETITE OR OVEREATING: MORE THAN HALF THE DAYS

## 2019-12-11 NOTE — LETTER
December 11, 2019      Umberto Narvaez  [address] [place]  [facility] [place] [address]        Dear ,    We are writing to inform you of your test results.    Test results came back within normal parameters.     Resulted Orders   Lipid panel reflex to direct LDL Fasting   Result Value Ref Range    Cholesterol 175 <200 mg/dL    Triglycerides 100 <150 mg/dL      Comment:      Fasting specimen    HDL Cholesterol 46 >39 mg/dL    LDL Cholesterol Calculated 109 (H) <100 mg/dL      Comment:      Above desirable:  100-129 mg/dl  Borderline High:  130-159 mg/dL  High:             160-189 mg/dL  Very high:       >189 mg/dl      Non HDL Cholesterol 129 <130 mg/dL   Glucose   Result Value Ref Range    Glucose 95 70 - 99 mg/dL      Comment:      Fasting specimen       If you have any questions or concerns, please call the clinic at the number listed above.       Sincerely,        Hood Tolliver MD

## 2019-12-11 NOTE — PROGRESS NOTES
SUBJECTIVE:   CC: Umberto Narvaez is an 49 year old male who presents for preventative health visit.     49 yr old male here for his annual and medication refills. He has depression and is on Wellbutrin and Cymbalta. Denies any side effects.   Went over preventive health- needs a FIT test , labs ordered today.    Healthy Habits:     Getting at least 3 servings of Calcium per day:  Yes    Bi-annual eye exam:  NO    Dental care twice a year:  Yes    Sleep apnea or symptoms of sleep apnea:  Excessive snoring and Sleep apnea    Diet:  Regular (no restrictions)    Frequency of exercise:  None    Taking medications regularly:  No    Barriers to taking medications:  Problems remembering to take them    Medication side effects:  Not applicable    PHQ-2 Total Score: 6    Additional concerns today:  No    Chief Complaint   Patient presents with     Physical     Depression     anxiety      Refill Request     Flu Shot     Blood Draw     Patient is fasting          Medication Followup of medication listed in      Taking Medication as prescribed: patient does miss sometimes     Side Effects:  None    Medication Helping Symptoms:  yes     Depression and Anxiety Follow-Up    How are you doing with your depression since your last visit? Worsened in the last week and a 1/2 with weather and changes in sunlight     How are you doing with your anxiety since your last visit?  No change    Are you having other symptoms that might be associated with depression or anxiety? Yes:  sunlight     Have you had a significant life event? No     Do you have any concerns with your use of alcohol or other drugs? No    Social History     Tobacco Use     Smoking status: Never Smoker     Smokeless tobacco: Never Used   Substance Use Topics     Alcohol use: Yes     Comment: 1/week     Drug use: No     PHQ 10/15/2018 12/9/2019 12/11/2019   PHQ-9 Total Score 14 14 -   Q9: Thoughts of better off dead/self-harm past 2 weeks Not at all Not at all Not  at all     STEFANIA-7 SCORE 6/6/2018 10/15/2018 12/11/2019   Total Score 13 12 13     Last PHQ-9 12/11/2019   1.  Little interest or pleasure in doing things 3   2.  Feeling down, depressed, or hopeless 3   3.  Trouble falling or staying asleep, or sleeping too much 3   4.  Feeling tired or having little energy 1   5.  Poor appetite or overeating 1   6.  Feeling bad about yourself -   7.  Trouble concentrating 2   8.  Moving slowly or restless 0   Q9: Thoughts of better off dead/self-harm past 2 weeks 0   PHQ-9 Total Score -   Difficulty at work, home, or with people Very difficult     STEFANIA-7  12/11/2019   1. Feeling nervous, anxious, or on edge 3   2. Not being able to stop or control worrying 2   3. Worrying too much about different things 3   4. Trouble relaxing 2   5. Being so restless that it is hard to sit still 0   6. Becoming easily annoyed or irritable 2   7. Feeling afraid, as if something awful might happen 1   STEFANIA-7 Total Score 13   If you checked any problems, how difficult have they made it for you to do your work, take care of things at home, or get along with other people? Very difficult     In the past two weeks have you had thoughts of suicide or self-harm?  No.    Do you have concerns about your personal safety or the safety of others?   No    Suicide Assessment Five-step Evaluation and Treatment (SAFE-T)      Today's PHQ-2 Score:   PHQ-2 ( 1999 [employer]) 12/9/2019   Q1: Little interest or pleasure in doing things 3   Q2: Feeling down, depressed or hopeless 3   PHQ-2 Score 6   Q1: Little interest or pleasure in doing things Nearly every day   Q2: Feeling down, depressed or hopeless Nearly every day   PHQ-2 Score 6       Abuse: Current or Past(Physical, Sexual or Emotional)- No  Do you feel safe in your environment? Yes        Social History     Tobacco Use     Smoking status: Never Smoker     Smokeless tobacco: Never Used   Substance Use Topics     Alcohol use: Yes     Comment: 1/week     If you drink  alcohol do you typically have >3 drinks per day or >7 drinks per week? No    Alcohol Use 12/11/2019   Prescreen: >3 drinks/day or >7 drinks/week? -   Prescreen: >3 drinks/day or >7 drinks/week? No       Last PSA:   PSA   Date Value Ref Range Status   04/17/2017 0.71 0 - 4 ug/L Final     Comment:     Assay Method:  Chemiluminescence using Siemens Vista analyzer       Reviewed orders with patient. Reviewed health maintenance and updated orders accordingly - Yes  BP Readings from Last 3 Encounters:   12/11/19 110/70   12/24/18 160/84   10/15/18 124/68    Wt Readings from Last 3 Encounters:   12/11/19 145.6 kg (321 lb)   12/24/18 (!) 154.2 kg (340 lb)   10/15/18 (!) 155.6 kg (343 lb)                  Patient Active Problem List   Diagnosis     Obesity     HYPERLIPIDEMIA LDL GOAL <160     Mild recurrent major depression (H)     Sleep related hypoventilation/hypoxemia in other disease     LAURA (obstructive sleep apnea)     Ankle sprain     Edema     STEFANIA (generalized anxiety disorder)     Major depressive disorder, recurrent episode, moderate (H)     Past Surgical History:   Procedure Laterality Date     SURGICAL HISTORY OF -   10/09    lasic     TONSILLECTOMY         Social History     Tobacco Use     Smoking status: Never Smoker     Smokeless tobacco: Never Used   Substance Use Topics     Alcohol use: Yes     Comment: 1/week     Family History   Problem Relation Age of Onset     Family History Negative Mother      Heart Disease Father         heart murmur     Depression Father      Family History Negative Sister      Cerebrovascular Disease Paternal Grandfather      Diabetes No family hx of          Current Outpatient Medications   Medication Sig Dispense Refill     buPROPion (WELLBUTRIN XL) 300 MG 24 hr tablet Take 1 tablet (300 mg) by mouth every morning 90 tablet 3     DULoxetine (CYMBALTA) 60 MG capsule Take 1 capsule (60 mg) by mouth daily 90 capsule 3     ORDER FOR DME Respironics BiPAP  IPAP 18 cm H2O EPAP 12 cm  H2O  Lifetime need and heated humidity.           ORDER FOR DME Overnight Oximetry on BiPap to be done After 7/26/2012         No Known Allergies    Reviewed and updated as needed this visit by clinical staff  Tobacco  Allergies  Meds  Problems  Med Hx  Surg Hx  Fam Hx  Soc Hx          Reviewed and updated as needed this visit by Provider  Tobacco  Allergies  Meds  Problems  Med Hx  Surg Hx  Fam Hx        Past Medical History:   Diagnosis Date     Depression       Past Surgical History:   Procedure Laterality Date     SURGICAL HISTORY OF -   10/09    lasic     TONSILLECTOMY         Review of Systems   Constitutional: Negative for chills and fever.   HENT: Negative for congestion, ear pain, hearing loss and sore throat.    Eyes: Negative for pain and visual disturbance.   Respiratory: Negative for cough and shortness of breath.    Cardiovascular: Negative for chest pain, palpitations and peripheral edema.   Gastrointestinal: Negative for abdominal pain, constipation, diarrhea, heartburn and nausea.   Genitourinary: Positive for impotence. Negative for discharge, dysuria, frequency, genital sores, hematuria and urgency.   Musculoskeletal: Negative for arthralgias, joint swelling and myalgias.   Skin: Negative for rash.   Neurological: Negative for dizziness, weakness, headaches and paresthesias.   Psychiatric/Behavioral: Negative for mood changes. The patient is nervous/anxious.      CONSTITUTIONAL: NEGATIVE for fever, chills, change in weight  INTEGUMENTARY/SKIN: NEGATIVE for worrisome rashes, moles or lesions  EYES: NEGATIVE for vision changes or irritation  ENT: NEGATIVE for ear, mouth and throat problems  RESP: NEGATIVE for significant cough or SOB  CV: NEGATIVE for chest pain, palpitations or peripheral edema  GI: NEGATIVE for nausea, abdominal pain, heartburn, or change in bowel habits   male: negative for dysuria, hematuria, decreased urinary stream, erectile dysfunction, urethral  "discharge  MUSCULOSKELETAL: NEGATIVE for significant arthralgias or myalgia  NEURO: NEGATIVE for weakness, dizziness or paresthesias  PSYCHIATRIC: NEGATIVE for changes in mood or affect    OBJECTIVE:   /70   Pulse 73   Temp 98.6  F (37  C) (Tympanic)   Resp 18   Ht 1.784 m (5' 10.25\")   Wt 145.6 kg (321 lb)   SpO2 97%   BMI 45.73 kg/m      Physical Exam  GENERAL: healthy, alert and no distress  EYES: Eyes grossly normal to inspection, PERRL and conjunctivae and sclerae normal  HENT: ear canals and TM's normal, nose and mouth without ulcers or lesions  NECK: no adenopathy, no asymmetry, masses, or scars and thyroid normal to palpation  RESP: lungs clear to auscultation - no rales, rhonchi or wheezes  CV: regular rate and rhythm, normal S1 S2, no S3 or S4, no murmur, click or rub, no peripheral edema and peripheral pulses strong  ABDOMEN: soft, nontender, no hepatosplenomegaly, no masses and bowel sounds normal  MS: no gross musculoskeletal defects noted, no edema  SKIN: no suspicious lesions or rashes  PSYCH: mentation appears normal, affect normal/bright    Diagnostic Test Results:  Pending     ASSESSMENT/PLAN:   1. Routine general medical examination at a health care facility  Patient will be notified of results  - Lipid panel reflex to direct LDL Fasting  - Glucose    2. Major depressive disorder, recurrent episode, moderate (H)  Medication refilled.  - buPROPion (WELLBUTRIN XL) 300 MG 24 hr tablet; Take 1 tablet (300 mg) by mouth every morning  Dispense: 90 tablet; Refill: 3  - DULoxetine (CYMBALTA) 60 MG capsule; Take 1 capsule (60 mg) by mouth daily  Dispense: 90 capsule; Refill: 3    3. STEFANIA (generalized anxiety disorder)  Medication refilled.  - DULoxetine (CYMBALTA) 60 MG capsule; Take 1 capsule (60 mg) by mouth daily  Dispense: 90 capsule; Refill: 3    4. Screen for colon cancer  Patient will be notified of results  - Fecal colorectal cancer screen (FIT); Future    5. Vitamin D " "deficiency  Patient will be notified of results  - Vitamin D Deficiency  - Vitamin B12    COUNSELING:   Reviewed preventive health counseling, as reflected in patient instructions       Regular exercise       Healthy diet/nutrition       Vision screening    Estimated body mass index is 45.73 kg/m  as calculated from the following:    Height as of this encounter: 1.784 m (5' 10.25\").    Weight as of this encounter: 145.6 kg (321 lb).     Weight management plan: Discussed healthy diet and exercise guidelines   Patient working on portions - trying to eat more healthy foods.     reports that he has never smoked. He has never used smokeless tobacco.      Counseling Resources:  ATP IV Guidelines  Pooled Cohorts Equation Calculator  FRAX Risk Assessment  ICSI Preventive Guidelines  Dietary Guidelines for Americans, 2010  USDA's MyPlate  ASA Prophylaxis  Lung CA Screening    Hood Tolliver MD  [facility]  Answers for HPI/ROS submitted by the patient on 12/9/2019   Annual Exam:  If you checked off any problems, how difficult have these problems made it for you to do your work, take care of things at home, or get along with other people?: Very difficult  PHQ9 TOTAL SCORE: 14    "

## 2019-12-11 NOTE — TELEPHONE ENCOUNTER
Panel Management Review      Patient has the following on his problem list:       Composite cancer screening  Chart review shows that this patient is due/due soon for the following Fecal Colorectal (FIT)  Summary:    Patient is due/failing the following:   FIT    Action needed:   Patient needs referral/order: Patient was given a fit test     Type of outreach:    Patient was given fit test will call in 4 weeks to see if completed     Questions for provider review:    None                                                                                                                                    Ileana Garibay CMA     Chart routed to Care Team .

## 2019-12-12 DIAGNOSIS — E55.9 VITAMIN D DEFICIENCY: Primary | ICD-10-CM

## 2019-12-12 RX ORDER — ERGOCALCIFEROL 1.25 MG/1
50000 CAPSULE, LIQUID FILLED ORAL WEEKLY
Qty: 8 CAPSULE | Refills: 0 | Status: SHIPPED | OUTPATIENT
Start: 2019-12-12

## 2019-12-12 ASSESSMENT — ANXIETY QUESTIONNAIRES: GAD7 TOTAL SCORE: 13

## 2019-12-12 NOTE — RESULT ENCOUNTER NOTE
Please inform patient that test result vitamin d was low. I will fax some vitamin d to the pharmacy for the patient.   Thank you.     Hood Tolliver M.D.

## 2019-12-30 DIAGNOSIS — Z12.11 SCREEN FOR COLON CANCER: ICD-10-CM

## 2019-12-30 PROCEDURE — 82274 ASSAY TEST FOR BLOOD FECAL: CPT | Performed by: FAMILY MEDICINE

## 2019-12-30 NOTE — LETTER
January 8, 2020      Umberto MONICA Hutchinsjaden  [address] [place]  [facility] [address]        Dear ,    We are writing to inform you of your test results.  Your recent FIT (fecal colorectal cancer screening) test was negative.  We will repeat this test annually.    Resulted Orders   Fecal colorectal cancer screen (FIT)   Result Value Ref Range    Occult Blood Scn FIT Negative NEG^Negative       If you have any questions or concerns, please call the clinic at the number listed above.       Sincerely,        Hood Tolliver MD/bmc

## 2020-01-07 LAB — HEMOCCULT STL QL IA: NEGATIVE

## 2020-12-06 ENCOUNTER — HEALTH MAINTENANCE LETTER (OUTPATIENT)
Age: 50
End: 2020-12-06

## 2021-01-15 ENCOUNTER — HEALTH MAINTENANCE LETTER (OUTPATIENT)
Age: 51
End: 2021-01-15

## 2021-01-21 ENCOUNTER — TELEPHONE (OUTPATIENT)
Dept: FAMILY MEDICINE | Facility: CLINIC | Age: 51
End: 2021-01-21

## 2021-01-21 DIAGNOSIS — G47.39 OTHER SLEEP APNEA: Primary | ICD-10-CM

## 2021-01-21 ASSESSMENT — ANXIETY QUESTIONNAIRES
GAD7 TOTAL SCORE: 11
3. WORRYING TOO MUCH ABOUT DIFFERENT THINGS: MORE THAN HALF THE DAYS
5. BEING SO RESTLESS THAT IT IS HARD TO SIT STILL: NOT AT ALL
2. NOT BEING ABLE TO STOP OR CONTROL WORRYING: MORE THAN HALF THE DAYS
1. FEELING NERVOUS, ANXIOUS, OR ON EDGE: MORE THAN HALF THE DAYS
IF YOU CHECKED OFF ANY PROBLEMS ON THIS QUESTIONNAIRE, HOW DIFFICULT HAVE THESE PROBLEMS MADE IT FOR YOU TO DO YOUR WORK, TAKE CARE OF THINGS AT HOME, OR GET ALONG WITH OTHER PEOPLE: SOMEWHAT DIFFICULT
7. FEELING AFRAID AS IF SOMETHING AWFUL MIGHT HAPPEN: SEVERAL DAYS
6. BECOMING EASILY ANNOYED OR IRRITABLE: MORE THAN HALF THE DAYS

## 2021-01-21 ASSESSMENT — PATIENT HEALTH QUESTIONNAIRE - PHQ9
5. POOR APPETITE OR OVEREATING: MORE THAN HALF THE DAYS
SUM OF ALL RESPONSES TO PHQ QUESTIONS 1-9: 14

## 2021-01-21 NOTE — TELEPHONE ENCOUNTER
Patient Quality Outreach Summary      Summary:    Patient is due/failing the following:   FIT and PHQ-9 Needed   FIT test currently on back order.    Type of outreach:    Phone, spoke with the patient, questionnaires updated, patient states symptoms are stable, continues to take meds with no concerns.    His BIPAP machine broke, his current sleep doctor is not covered by his insurance.  Last sleep study about 10 years ago.  Needs to know what to do to get new machine, ? Referral to sleep doctor.     PHQ 12/9/2019 12/11/2019 1/21/2021   PHQ-9 Total Score 14 - 14   Q9: Thoughts of better off dead/self-harm past 2 weeks Not at all Not at all Not at all     STEFANIA-7 SCORE 10/15/2018 12/11/2019 1/21/2021   Total Score 12 13 11       PHQ-9 ([employer]) 1/21/2021   No Interest In Doing Things    Feeling Depressed    Trouble Sleeping    Tired / No Energy    No appetite or Over-Eating    Feeling Bad about Self    Trouble Concentrating    Moving Slow or Restless    Suicidal Thoughts    Total Score    1.  Little interest or pleasure in doing things 3   2.  Feeling down, depressed, or hopeless 2   3.  Trouble falling or staying asleep, or sleeping too much 3   4.  Feeling tired or having little energy 2   5.  Poor appetite or overeating 1   6.  Feeling bad about yourself 2   7.  Trouble concentrating 1   8.  Moving slowly or restless 0   9.  Suicidal or self-harm thoughts 0   PHQ-9 Total Score 14   Difficulty at work, home, or with people Somewhat difficult   1.  Little interest or pleasure in doing things    2.  Feeling down, depressed, or hopeless    3.  Trouble falling or staying asleep, or sleeping too much    4.  Feeling tired or having little energy    5.  Poor appetite or overeating    6.  Feeling bad about yourself    7.  Trouble concentrating    8.  Moving slowly or restless    9.  Suicidal or self-harm thoughts    PHQ-9 via [employer] TOTAL SCORE----->    Difficulty at work, home, or with people    STEFANIA-7   [employer], 2002;  Used with Permission) 1/21/2021   1. Feeling nervous, anxious, or on edge 2   2. Not being able to stop or control worrying 2   3. Worrying too much about different things 2   4. Trouble relaxing 2   5. Being so restless that it is hard to sit still 0   6. Becoming easily annoyed or irritable 2   7. Feeling afraid, as if something awful might happen 1   STEFANIA-7 Total Score 11   If you checked any problems, how difficult have they made it for you to do your work, take care of things at home, or get along with other people? Somewhat difficult       Questions for provider review:    See questions above.                                                                                                                    RALPH Mccormick MA       Chart routed to Provider.

## 2021-01-22 ASSESSMENT — ANXIETY QUESTIONNAIRES: GAD7 TOTAL SCORE: 11

## 2021-02-12 ASSESSMENT — ENCOUNTER SYMPTOMS
HEADACHES: 0
NAUSEA: 0
CHILLS: 0
JOINT SWELLING: 0
ABDOMINAL PAIN: 0
SORE THROAT: 0
SHORTNESS OF BREATH: 0
DIARRHEA: 0
WEAKNESS: 0
PALPITATIONS: 0
HEMATOCHEZIA: 0
FREQUENCY: 0
MYALGIAS: 0
DIZZINESS: 0
HEARTBURN: 0
HEMATURIA: 0
NERVOUS/ANXIOUS: 0
PARESTHESIAS: 0
COUGH: 0
FEVER: 0
EYE PAIN: 0
DYSURIA: 0
ARTHRALGIAS: 0
CONSTIPATION: 0

## 2021-02-12 ASSESSMENT — PATIENT HEALTH QUESTIONNAIRE - PHQ9
SUM OF ALL RESPONSES TO PHQ QUESTIONS 1-9: 14
10. IF YOU CHECKED OFF ANY PROBLEMS, HOW DIFFICULT HAVE THESE PROBLEMS MADE IT FOR YOU TO DO YOUR WORK, TAKE CARE OF THINGS AT HOME, OR GET ALONG WITH OTHER PEOPLE: SOMEWHAT DIFFICULT
SUM OF ALL RESPONSES TO PHQ QUESTIONS 1-9: 14

## 2021-02-13 ASSESSMENT — PATIENT HEALTH QUESTIONNAIRE - PHQ9: SUM OF ALL RESPONSES TO PHQ QUESTIONS 1-9: 14

## 2021-02-17 ENCOUNTER — VIRTUAL VISIT (OUTPATIENT)
Dept: SLEEP MEDICINE | Facility: CLINIC | Age: 51
End: 2021-02-17
Payer: COMMERCIAL

## 2021-02-17 ENCOUNTER — DOCUMENTATION ONLY (OUTPATIENT)
Dept: SLEEP MEDICINE | Facility: CLINIC | Age: 51
End: 2021-02-17

## 2021-02-17 ENCOUNTER — OFFICE VISIT (OUTPATIENT)
Dept: FAMILY MEDICINE | Facility: CLINIC | Age: 51
End: 2021-02-17
Payer: COMMERCIAL

## 2021-02-17 VITALS — HEIGHT: 70 IN | BODY MASS INDEX: 45.1 KG/M2 | WEIGHT: 315 LBS

## 2021-02-17 VITALS
OXYGEN SATURATION: 98 % | RESPIRATION RATE: 18 BRPM | BODY MASS INDEX: 45.1 KG/M2 | HEART RATE: 88 BPM | WEIGHT: 315 LBS | DIASTOLIC BLOOD PRESSURE: 82 MMHG | TEMPERATURE: 98 F | HEIGHT: 70 IN | SYSTOLIC BLOOD PRESSURE: 130 MMHG

## 2021-02-17 DIAGNOSIS — Z13.1 SCREENING FOR DIABETES MELLITUS: ICD-10-CM

## 2021-02-17 DIAGNOSIS — G47.33 OSA (OBSTRUCTIVE SLEEP APNEA): ICD-10-CM

## 2021-02-17 DIAGNOSIS — G47.33 OSA (OBSTRUCTIVE SLEEP APNEA): Primary | ICD-10-CM

## 2021-02-17 DIAGNOSIS — Z13.220 LIPID SCREENING: ICD-10-CM

## 2021-02-17 DIAGNOSIS — Z12.11 COLON CANCER SCREENING: ICD-10-CM

## 2021-02-17 DIAGNOSIS — Z00.00 ENCOUNTER FOR ROUTINE ADULT HEALTH EXAMINATION WITHOUT ABNORMAL FINDINGS: Primary | ICD-10-CM

## 2021-02-17 DIAGNOSIS — E55.9 VITAMIN D DEFICIENCY: ICD-10-CM

## 2021-02-17 DIAGNOSIS — R21 RASH: ICD-10-CM

## 2021-02-17 PROBLEM — E66.01 MORBID OBESITY WITH BMI OF 50.0-59.9, ADULT (H): Status: ACTIVE | Noted: 2021-02-17

## 2021-02-17 LAB
CHOLEST SERPL-MCNC: 199 MG/DL
GLUCOSE SERPL-MCNC: 109 MG/DL (ref 70–99)
HDLC SERPL-MCNC: 54 MG/DL
LDLC SERPL CALC-MCNC: 124 MG/DL
NONHDLC SERPL-MCNC: 145 MG/DL
TRIGL SERPL-MCNC: 104 MG/DL

## 2021-02-17 PROCEDURE — 36415 COLL VENOUS BLD VENIPUNCTURE: CPT | Performed by: FAMILY MEDICINE

## 2021-02-17 PROCEDURE — 82947 ASSAY GLUCOSE BLOOD QUANT: CPT | Performed by: FAMILY MEDICINE

## 2021-02-17 PROCEDURE — 80061 LIPID PANEL: CPT | Performed by: FAMILY MEDICINE

## 2021-02-17 PROCEDURE — 99396 PREV VISIT EST AGE 40-64: CPT | Performed by: FAMILY MEDICINE

## 2021-02-17 PROCEDURE — 82306 VITAMIN D 25 HYDROXY: CPT | Performed by: FAMILY MEDICINE

## 2021-02-17 PROCEDURE — 99203 OFFICE O/P NEW LOW 30 MIN: CPT | Mod: 95 | Performed by: INTERNAL MEDICINE

## 2021-02-17 PROCEDURE — 99213 OFFICE O/P EST LOW 20 MIN: CPT | Mod: 25 | Performed by: FAMILY MEDICINE

## 2021-02-17 ASSESSMENT — ENCOUNTER SYMPTOMS
NAUSEA: 0
DIZZINESS: 0
EYE PAIN: 0
FEVER: 0
HEMATOCHEZIA: 0
SHORTNESS OF BREATH: 0
DIARRHEA: 0
DYSURIA: 0
NERVOUS/ANXIOUS: 0
HEADACHES: 0
HEMATURIA: 0
SORE THROAT: 0
FREQUENCY: 0
HEARTBURN: 0
ABDOMINAL PAIN: 0
JOINT SWELLING: 0
WEAKNESS: 0
CONSTIPATION: 0
MYALGIAS: 0
COUGH: 0
PALPITATIONS: 0
PARESTHESIAS: 0
ARTHRALGIAS: 0
CHILLS: 0

## 2021-02-17 ASSESSMENT — MIFFLIN-ST. JEOR
SCORE: 2384.77
SCORE: 2384.77

## 2021-02-17 NOTE — PROGRESS NOTES
Umberto is a 50 year old who is being evaluated via a billable video visit.      How would you like to obtain your AVS? MyChart  If the video visit is dropped, the invitation should be resent by: Text to cell phone: [phone]  Will anyone else be joining your video visit? No     Latricia Morrow MA on 2/17/2021 at 2:58 PM        Video Start Time: 3 PM  Video-Visit Details    Type of service:  Video Visit    Video End Time:3:24 PM    Originating Location (pt. Location): Home    Distant Location (provider location):  [facility] [place]     Platform used for Video Visit: [facility] [place]   Outpatient Sleep Medicine Consultation  February 17, 2021      Name: Umberto Narvaez MRN# [id number]   Age: 50 year old YOB: 1970     Date of Consultation: February 17, 2021  Consultation is requested by: No referring provider defined for this encounter. No ref. provider found  Primary care provider: Hood Tolliver         Reason for Sleep Consult:     Umberto Narvaez is a 50 year old male for complaints of non-- functioning positive airway pressure device for sleep apnea and recurrent symptoms         Assessment and Plan:     Summary Sleep Diagnoses:    Severe obstructive sleep apnea with hypoxemia previously effective treated with bilevel positive airway pressure with some residual hypoxia in 2018 currently they nonfunctioning positive airway pressure device over the past 4-1/2 months    Comorbid Diagnoses:    Mood disturbance with generalized anxiety and depression    Morbid obesity BMI 48 with normal bicarbonate and awake oxygen saturation making daytime hypoventilation less likely    Summary Recommendations:    Replacement bilevel positive airway pressure device 18/12 with full facemask    Follow-up in 6 weeks or earlier if there are problems with mask intolerance           History of Present Illness:     Umberto Narvaez is a 50 year old male currently  complaining of sleep disruption, daytime sleepiness, nonrestorative sleep nocturnal snoring.  Has a nonfunctioning bilevel positive airway pressure device which needs to be replaced.  Previous studies below demonstrate extremely severe obstructive sleep apnea with hypoxemia and follow-up oximetry demonstrated modest residual likely REM related hypoxemia.                  SLEEP-WAKE SCHEDULE:       SCALES          SLEEP COMPLAINTS:  Cardio-respiratory    Snoring- 7x/week  Dyspnea- denies  Morning headaches or confusion-denies  Coexisting Lung disease: denies    Coexisting Heart disease: denies    Does patient have a bed partner: denies  Has bed partner been sleeping separately because of snoring:  denies            RLS Screen: When you try to relax in the evening or sleep at  night, do you ever have unpleasant, restless feelings in your  legs that can be relieved by walking or movement? denies    Periodic limb movement: denies    Narcolepsy:  denies sudden urges of sleep attacks    Cataplexy:  denies     Sleep paralysis:  denies      Hallucinations:   denies       Sleep Behaviors:    Leg symptoms/movements: denies    Motor restlessness:denies    Night terrors: denies    Bruxism: denies    Automatic behaviors: none    Other subjective complaints:    Anxiety or rumination denies    Pain and discomfort at  night: denies    Waking up with heart pounding or racing: denies    GERD or aspiration:denies         Parasomnia:   NREM - denies recurrent persistent confusional arousal, night eating, sleep walking or sleep terrors   REM  - denies dream enactment; injuries              Problem List:     Patient Active Problem List   Diagnosis     Obesity     HYPERLIPIDEMIA LDL GOAL <160     Mild recurrent major depression (H)     Sleep related hypoventilation/hypoxemia in other disease     LAURA (obstructive sleep apnea)     Ankle sprain     Edema     STEFANIA (generalized anxiety disorder)     Major depressive disorder, recurrent episode,  moderate (H)            Medications:     Current Outpatient Medications   Medication Sig     buPROPion (WELLBUTRIN XL) 300 MG 24 hr tablet Take 1 Tablet BY MOUTH EVERY MORNING     DULoxetine (CYMBALTA) 60 MG capsule Take 1 Capsule BY MOUTH EVERY DAY     ORDER FOR DME Respironics BiPAP  IPAP 18 cm H2O EPAP 12 cm H2O  Lifetime need and heated humidity.         ORDER FOR DME Overnight Oximetry on BiPap to be done After 7/26/2012       vitamin D2 (ERGOCALCIFEROL) 31754 units (1250 mcg) capsule Take 1 capsule (50,000 Units) by mouth once a week     No current facility-administered medications for this visit.       No Known Allergies         Past Medical History:       Past Medical History:   Diagnosis Date     Depression              Past Surgical History:         Past Surgical History:   Procedure Laterality Date     SURGICAL HISTORY OF -   10/09    lasic     TONSILLECTOMY              Social History:     Social History     Tobacco Use     Smoking status: Never Smoker     Smokeless tobacco: Never Used   Substance Use Topics     Alcohol use: Yes     Comment: 1/week                Family History:     Family History   Problem Relation Age of Onset     Family History Negative Mother      Heart Disease Father         heart murmur     Depression Father      Family History Negative Sister      Cerebrovascular Disease Paternal Grandfather      Diabetes No family hx of            Review of Systems:     A complete 10 point review of systems was negative other than HPI or as commented below:     none         Physical Examination:     Objective: Severe obesity without cyanosis or asterixis    Reported vitals:  There were no vitals taken for this visit.   GENERAL: Healthy, alert and no distress  EYES: Eyes grossly normal to inspection.  No discharge or erythema, or obvious scleral/conjunctival abnormalities.  RESP: No audible wheeze, cough, or visible cyanosis.  No visible retractions or increased work of breathing.    SKIN: Visible  skin clear. No significant rash, abnormal pigmentation or lesions.  NEURO: Cranial nerves grossly intact.  Mentation and speech appropriate for age.  PSYCH: Mentation appears normal, affect normal/bright, judgement and insight intact, normal speech and appearance well-groomed.           Data: All pertinent previous laboratory data reviewed     No results found for: PH, PHARTERIAL, PO2, [id number], SAT, PCO2, HCO3, BASEEXCESS, SHADI, BEB  Lab Results   Component Value Date    TSH 1.80 04/17/2017    TSH 1.09 06/29/2012     Lab Results   Component Value Date     (H) 02/17/2021    GLC 95 12/11/2019     Lab Results   Component Value Date    HGB 15.8 06/29/2012    HGB 15.9 07/15/2010     Lab Results   Component Value Date    BUN 14 06/29/2012    BUN 21 07/15/2010    CR 0.79 06/29/2012    CR 0.76 07/15/2010     Lab Results   Component Value Date    AST 36 06/29/2012    AST 28 07/15/2010    ALT 46 06/29/2012    ALT 32 07/15/2010    ALKPHOS 89 06/29/2012    ALKPHOS 61 07/15/2010    BILITOTAL 0.8 06/29/2012    BILITOTAL 1.0 07/15/2010     No results found for: UAMP, UBARB, BENZODIAZEUR, UCANN, UCOC, OPIT, UPCP          Total time spent with patient: 25 min >50% counseling and 15 minutes documenting and reviewing records   Copy to: Hood Tolliver MD 2/17/2021

## 2021-02-17 NOTE — PROGRESS NOTES
SUBJECTIVE:   CC: Umberto Narvaez is an 50 year old male who presents for preventative health visit.       Patient is a 50 yr old male here for his annual physical. Patient has a history of obesity,depression ,vitamin deficiency. He reports that he has been doing mostly okay. He reports that his CPAP machine broke down and he is wondering if he needs another sleep study. Patient reports that his last sleep study was eight years ago.He needs a prescription for the materials for the CPAP.   Patient also mentioned a rash on both fore arms. He is not sure what triggered the rash.It does not itch. He has not changed soaps or detergents.     He also mentioned that he still struggles with depression and he thinks it is because he is not taking his medication frequently.He declined therapy says he did not think it helped. He is presently on Wellbutrin and Cymbalta.  Lastly he has also struggled with Erectile dysfunction and he just wanted to mention this .     Patient is fasting. Patient would like to do FIT test.    Patient has been advised of split billing requirements and indicates understanding: Yes  Healthy Habits:     Getting at least 3 servings of Calcium per day:  Yes    Bi-annual eye exam:  NO    Dental care twice a year:  Yes    Sleep apnea or symptoms of sleep apnea:  Sleep apnea    Diet:  Regular (no restrictions)    Frequency of exercise:  None    Taking medications regularly:  Yes    Medication side effects:  Not applicable    PHQ-2 Total Score: 6    Additional concerns today:  Yes              Today's PHQ-2 Score:   PHQ-2 ( 1999 [employer]) 2/12/2021   Q1: Little interest or pleasure in doing things 3   Q2: Feeling down, depressed or hopeless 3   PHQ-2 Score 6   Q1: Little interest or pleasure in doing things Nearly every day   Q2: Feeling down, depressed or hopeless Nearly every day   PHQ-2 Score 6       Abuse: Current or Past(Physical, Sexual or Emotional)- No  Do you feel safe in your environment? Yes    Have  you ever done Advance Care Planning? (For example, a Health Directive, POLST, or a discussion with a medical provider or your loved ones about your wishes): No, advance care planning information given to patient to review.  Patient declined advance care planning discussion at this time.    Social History     Tobacco Use     Smoking status: Never Smoker     Smokeless tobacco: Never Used   Substance Use Topics     Alcohol use: Yes     Comment: 1/week         Alcohol Use 2/17/2021   Prescreen: >3 drinks/day or >7 drinks/week? -   Prescreen: >3 drinks/day or >7 drinks/week? No       Last PSA:   PSA   Date Value Ref Range Status   04/17/2017 0.71 0 - 4 ug/L Final     Comment:     Assay Method:  Chemiluminescence using Siemens Vista analyzer       Reviewed orders with patient. Reviewed health maintenance and updated orders accordingly - Yes  BP Readings from Last 3 Encounters:   02/17/21 130/82   12/11/19 110/70   12/24/18 160/84    Wt Readings from Last 3 Encounters:   02/17/21 (!) 152 kg (335 lb)   12/11/19 145.6 kg (321 lb)   12/24/18 (!) 154.2 kg (340 lb)                  Patient Active Problem List   Diagnosis     Obesity     HYPERLIPIDEMIA LDL GOAL <160     Mild recurrent major depression (H)     Sleep related hypoventilation/hypoxemia in other disease     LAURA (obstructive sleep apnea)     Ankle sprain     Edema     STEFANIA (generalized anxiety disorder)     Major depressive disorder, recurrent episode, moderate (H)     Past Surgical History:   Procedure Laterality Date     SURGICAL HISTORY OF -   10/09    lasic     TONSILLECTOMY         Social History     Tobacco Use     Smoking status: Never Smoker     Smokeless tobacco: Never Used   Substance Use Topics     Alcohol use: Yes     Comment: 1/week     Family History   Problem Relation Age of Onset     Family History Negative Mother      Heart Disease Father         heart murmur     Depression Father      Family History Negative Sister      Cerebrovascular Disease  Paternal Grandfather      Diabetes No family hx of          Current Outpatient Medications   Medication Sig Dispense Refill     buPROPion (WELLBUTRIN XL) 300 MG 24 hr tablet Take 1 Tablet BY MOUTH EVERY MORNING 90 tablet 1     DULoxetine (CYMBALTA) 60 MG capsule Take 1 Capsule BY MOUTH EVERY DAY 30 capsule 11     ORDER FOR DME Respironics BiPAP  IPAP 18 cm H2O EPAP 12 cm H2O  Lifetime need and heated humidity.           ORDER FOR DME Overnight Oximetry on BiPap to be done After 7/26/2012         vitamin D2 (ERGOCALCIFEROL) 55932 units (1250 mcg) capsule Take 1 capsule (50,000 Units) by mouth once a week (Patient not taking: Reported on 2/17/2021) 8 capsule 0     No Known Allergies    Reviewed and updated as needed this visit by clinical staff  Tobacco  Allergies  Meds  Problems  Med Hx  Surg Hx  Fam Hx  Soc Hx          Reviewed and updated as needed this visit by Provider  Tobacco  Allergies  Meds  Problems  Med Hx  Surg Hx  Fam Hx         Past Medical History:   Diagnosis Date     Depression       Past Surgical History:   Procedure Laterality Date     SURGICAL HISTORY OF -   10/09    lasic     TONSILLECTOMY         Review of Systems   Constitutional: Negative for chills and fever.   HENT: Negative for congestion, ear pain, hearing loss and sore throat.    Eyes: Negative for pain and visual disturbance.   Respiratory: Negative for cough and shortness of breath.    Cardiovascular: Negative for chest pain, palpitations and peripheral edema.   Gastrointestinal: Negative for abdominal pain, constipation, diarrhea, heartburn, hematochezia and nausea.   Genitourinary: Positive for impotence. Negative for discharge, dysuria, frequency, genital sores, hematuria and urgency.   Musculoskeletal: Negative for arthralgias, joint swelling and myalgias.   Skin: Positive for rash.   Neurological: Negative for dizziness, weakness, headaches and paresthesias.   Psychiatric/Behavioral: Negative for mood changes. The  "patient is not nervous/anxious.        OBJECTIVE:   /82   Pulse 88   Temp 98  F (36.7  C) (Tympanic)   Resp 18   Ht 1.784 m (5' 10.25\")   Wt (!) 152 kg (335 lb)   SpO2 98%   BMI 47.73 kg/m      Physical Exam  GENERAL: healthy, alert and no distress  EYES: Eyes grossly normal to inspection, PERRL and conjunctivae and sclerae normal  HENT: ear canals and TM's normal, nose and mouth without ulcers or lesions  NECK: no adenopathy, no asymmetry, masses, or scars and thyroid normal to palpation  RESP: lungs clear to auscultation - no rales, rhonchi or wheezes  CV: regular rate and rhythm, normal S1 S2, no S3 or S4, no murmur, click or rub, no peripheral edema and peripheral pulses strong  ABDOMEN: soft, nontender, no hepatosplenomegaly, no masses and bowel sounds normal  MS: no gross musculoskeletal defects noted, no edema  SKIN:Macular rash on forearms, blanches, red and scattered all over the forearms.   NEURO: Normal strength and tone, mentation intact and speech normal  PSYCH: mentation appears normal, affect normal/bright    Diagnostic Test Results:  Pending    ASSESSMENT/PLAN:   Umberto was seen today for physical.    Diagnoses and all orders for this visit:    Encounter for routine adult health examination without abnormal findings  -    Umberto is a 50 yr old male here for his annual physical. Doing well overall. Dicussed preventive health with patient.FIT screen ordered. Labs ordered.   Colon cancer screening  -     Fecal colorectal cancer screen (FIT); Future  -     Patient will be notified of results.     Lipid screening  -     Lipid panel reflex to direct LDL Fasting    Screening for diabetes mellitus  -     Glucose    Vitamin D deficiency  -     SLEEP EVALUATION & MANAGEMENT REFERRAL - ADULT -[facility] [place]  [phone] (Age 15 and up), [facility] [place] [phone] (Age 13 and up if over 100 lbs); Future  -     **Vitamin D Deficiency FUTURE anytime  -     " "Has had on and off low vitamin d last check was in 2019.  -     OFFICE/OUTPT VISIT,EST,LEVL IV    LAURA (obstructive sleep apnea)  -     CPAP Order for DME - ONLY FOR DME  -     OFFICE/OUTPT VISIT,EST,LEVL IV    Rash  -     OFFICE/OUTPT VISIT,EST,LEVL IV  -     Recommend hydrocortisone.         Patient has been advised of split billing requirements and indicates understanding: Yes  COUNSELING:   Reviewed preventive health counseling, as reflected in patient instructions       Regular exercise       Healthy diet/nutrition       Vision screening    Estimated body mass index is 47.73 kg/m  as calculated from the following:    Height as of this encounter: 1.784 m (5' 10.25\").    Weight as of this encounter: 152 kg (335 lb).     Weight management plan: Discussed healthy diet and exercise guidelines    He reports that he has never smoked. He has never used smokeless tobacco.      Counseling Resources:  ATP IV Guidelines  Pooled Cohorts Equation Calculator  FRAX Risk Assessment  ICSI Preventive Guidelines  Dietary Guidelines for Americans, 2010  [employer]'s MyPlate  ASA Prophylaxis  Lung CA Screening    Hood Tolliver MD  [facility]  Answers for HPI/ROS submitted by the patient on 2/12/2021   Annual Exam:  If you checked off any problems, how difficult have these problems made it for you to do your work, take care of things at home, or get along with other people?: Somewhat difficult  PHQ9 TOTAL SCORE: 14    Answers for HPI/ROS submitted by the patient on 2/12/2021   Annual Exam:  If you checked off any problems, how difficult have these problems made it for you to do your work, take care of things at home, or get along with other people?: Somewhat difficult  PHQ9 TOTAL SCORE: 14    "

## 2021-02-17 NOTE — PROGRESS NOTES
Answers for HPI/ROS submitted by the patient on 2/12/2021   Annual Exam:  Frequency of exercise:: None  Getting at least 3 servings of Calcium per day:: Yes  Diet:: Regular (no restrictions)  Taking medications regularly:: Yes  Medication side effects:: Not applicable  Bi-annual eye exam:: NO  Dental care twice a year:: Yes  Sleep apnea or symptoms of sleep apnea:: Sleep apnea  abdominal pain: No  Blood in stool: No  Blood in urine: No  chest pain: No  chills: No  congestion: No  constipation: No  cough: No  diarrhea: No  dizziness: No  ear pain: No  eye pain: No  nervous/anxious: No  fever: No  frequency: No  genital sores: No  headaches: No  hearing loss: No  heartburn: No  arthralgias: No  joint swelling: No  peripheral edema: No  mood changes: No  myalgias: No  nausea: No  dysuria: No  palpitations: No  Skin sensation changes: No  sore throat: No  urgency: No  rash: Yes  shortness of breath: No  visual disturbance: No  weakness: No  impotence: Yes  penile discharge: No  Additional concerns today:: Yes  If you checked off any problems, how difficult have these problems made it for you to do your work, take care of things at home, or get along with other people?: Somewhat difficult  PHQ9 TOTAL SCORE: 14

## 2021-02-17 NOTE — PATIENT INSTRUCTIONS
Your BMI is Body mass index is 47.73 kg/m .  Weight management is a personal decision.  If you are interested in exploring weight loss strategies, the following discussion covers the approaches that may be successful. Body mass index (BMI) is one way to tell whether you are at a healthy weight, overweight, or obese. It measures your weight in relation to your height.  A BMI of 18.5 to 24.9 is in the healthy range. A person with a BMI of 25 to 29.9 is considered overweight, and someone with a BMI of 30 or greater is considered obese. More than two-thirds of American adults are considered overweight or obese.  Being overweight or obese increases the risk for further weight gain. Excess weight may lead to heart disease and diabetes.  Creating and following plans for healthy eating and physical activity may help you improve your health.  Weight control is part of healthy lifestyle and includes exercise, emotional health, and healthy eating habits. Careful eating habits lifelong are the mainstay of weight control. Though there are significant health benefits from weight loss, long-term weight loss with diet alone may be very difficult to achieve- studies show long-term success with dietary management in less than 10% of people. Attaining a healthy weight may be especially difficult to achieve in those with severe obesity. In some cases, medications, devices and surgical management might be considered.  What can you do?  If you are overweight or obese and are interested in methods for weight loss, you should discuss this with your provider.     Consider reducing daily calorie intake by 500 calories.     Keep a food journal.     Avoiding skipping meals, consider cutting portions instead.    Diet combined with exercise helps maintain muscle while optimizing fat loss. Strength training is particularly important for building and maintaining muscle mass. Exercise helps reduce stress, increase energy, and improves fitness.  Increasing exercise without diet control, however, may not burn enough calories to loose weight.       Start walking three days a week 10-20 minutes at a time    Work towards walking thirty minutes five days a week     Eventually, increase the speed of your walking for 1-2 minutes at time    In addition, we recommend that you review healthy lifestyles and methods for weight loss available through the National Institutes of Health patient information sites:  http://win.niddk.nih.gov/publications/index.htm    And look into health and wellness programs that may be available through your health insurance provider, employer, local community center, or [employer].    Weight management plan: Patient was referred to their PCP to discuss a diet and exercise plan.

## 2021-02-17 NOTE — NURSING NOTE
"Initial /82   Pulse 88   Temp 98  F (36.7  C) (Tympanic)   Resp 18   Ht 1.784 m (5' 10.25\")   Wt (!) 152 kg (335 lb)   SpO2 98%   BMI 47.73 kg/m   Estimated body mass index is 47.73 kg/m  as calculated from the following:    Height as of this encounter: 1.784 m (5' 10.25\").    Weight as of this encounter: 152 kg (335 lb). .      "

## 2021-02-17 NOTE — PROGRESS NOTES
Completed download in [facility]. His machine kept shutting off while trying to get information off of machine with a new SD card. I will add the most recent data that is coming up in Care

## 2021-02-18 ENCOUNTER — DOCUMENTATION ONLY (OUTPATIENT)
Dept: SLEEP MEDICINE | Facility: CLINIC | Age: 51
End: 2021-02-18

## 2021-02-18 NOTE — PROGRESS NOTES
Patient was offered choice of vendor and chose [facility].  Patient Umberto Narveaz was set up at [place]  on February 18, 2021. Patient received a Resmed AirCurve 10 Auto. Pressures were set at 18/12 cm H2O.   Patient s ramp is off cm H2O for Off and FLEX/EPR is 2.  Patient received a Ashlee Respironics Mask name: Dreamwear  Full Face mask size Large, heated tubing and heated humidifier.  Patient does not need to meet compliance.   Jacquie Cheung

## 2021-02-19 LAB — DEPRECATED CALCIDIOL+CALCIFEROL SERPL-MC: 12 UG/L (ref 20–75)

## 2021-02-22 ENCOUNTER — DOCUMENTATION ONLY (OUTPATIENT)
Dept: SLEEP MEDICINE | Facility: CLINIC | Age: 51
End: 2021-02-22

## 2021-02-22 NOTE — PROGRESS NOTES
3 DAY STM VISIT    Diagnostic AHI: 100    PSG    Patient contacted for 3 day STM visit    Confirmed with patient at time of call- Yes Patient is still interested in STM service     Data only recheck    Replacement device: No  STM ordered by provider: Yes     Device type: Bilevel  PAP settings from order::  CPAP min 12 cm  H20       CPAP max 18 cm  H20        Mask type:    Full Face Mask      Assessment: Nightly usage over four hours.  Action plan: Patient removed from STM, STM not required or ordered.     Total time spent on accessing and  interpreting remote patient PAP therapy data  10 minutes  Total time spent counseling, coaching  and reviewing PAP therapy data with patient  0 minutes  [id number] no

## 2021-02-23 DIAGNOSIS — E55.9 VITAMIN D DEFICIENCY: Primary | ICD-10-CM

## 2021-02-23 NOTE — RESULT ENCOUNTER NOTE
Please inform patient that test result was within normal parameters except that the vitamin d levels were quite low.I will fax some replacement to the pharmacy. His glucose and his cholesterol were also slightly high. Recommend lifestyle changes for this.   Thank you.     Hood Tolliver M.D.

## 2021-02-25 PROCEDURE — 82274 ASSAY TEST FOR BLOOD FECAL: CPT | Performed by: FAMILY MEDICINE

## 2021-02-26 DIAGNOSIS — Z12.11 COLON CANCER SCREENING: ICD-10-CM

## 2021-02-26 LAB — HEMOCCULT STL QL IA: NEGATIVE

## 2021-03-17 ENCOUNTER — DOCUMENTATION ONLY (OUTPATIENT)
Dept: SLEEP MEDICINE | Facility: CLINIC | Age: 51
End: 2021-03-17

## 2021-03-17 NOTE — PROGRESS NOTES
Patient came to [place]  for mask fitting appointment on March 17, 2021. Patient requested to switch masks because poor seal/leak. Discussed the following masks: F30i-medium, Dreamwear FFM-medium Patient selected a Resmed Mask name: F30i Full Face mask size Medium

## 2021-03-24 ENCOUNTER — TELEPHONE (OUTPATIENT)
Dept: FAMILY MEDICINE | Facility: CLINIC | Age: 51
End: 2021-03-24

## 2021-04-02 ENCOUNTER — TELEPHONE (OUTPATIENT)
Dept: FAMILY MEDICINE | Facility: CLINIC | Age: 51
End: 2021-04-02

## 2021-04-02 ENCOUNTER — MYC MEDICAL ADVICE (OUTPATIENT)
Dept: FAMILY MEDICINE | Facility: CLINIC | Age: 51
End: 2021-04-02

## 2021-04-07 ENCOUNTER — VIRTUAL VISIT (OUTPATIENT)
Dept: SLEEP MEDICINE | Facility: CLINIC | Age: 51
End: 2021-04-07
Payer: COMMERCIAL

## 2021-04-07 ENCOUNTER — TELEPHONE (OUTPATIENT)
Dept: SLEEP MEDICINE | Facility: CLINIC | Age: 51
End: 2021-04-07

## 2021-04-07 VITALS — BODY MASS INDEX: 45.1 KG/M2 | WEIGHT: 315 LBS | HEIGHT: 70 IN

## 2021-04-07 DIAGNOSIS — G47.33 OSA (OBSTRUCTIVE SLEEP APNEA): Primary | ICD-10-CM

## 2021-04-07 PROCEDURE — 99213 OFFICE O/P EST LOW 20 MIN: CPT | Mod: 95 | Performed by: INTERNAL MEDICINE

## 2021-04-07 RX ORDER — MODAFINIL 200 MG/1
200 TABLET ORAL DAILY
Qty: 30 TABLET | Refills: 5 | Status: SHIPPED | OUTPATIENT
Start: 2021-04-07

## 2021-04-07 ASSESSMENT — MIFFLIN-ST. JEOR: SCORE: 2380.8

## 2021-04-07 NOTE — TELEPHONE ENCOUNTER
Prior Authorization Specialty Medication Request    Medication/Dose: rec'd faxed request prior auth [facility]  MODAFINIL 200 mg tabs      ICD code (if different than what is on RX): Previously Tried and Failed:    Important Lab Values:Rationale:   Insurance Name:[facility] OOS  Insurance ID: [id number]  Insurance Phone Number:   Pharmacy Information (if different than what is on RX)  Name:  [facility]  Phone: [phone]    ROUTED PA MEDICATION TEAM, DR. MARTINEZ FOR REVIEW

## 2021-04-07 NOTE — PROGRESS NOTES
Umberto is a 50 year old who is being evaluated via a billable video visit.      How would you like to obtain your AVS? MyChart  If the video visit is dropped, the invitation should be resent by: Send to e-mail at: [email or web address]  Will anyone else be joining your video visit? No      Video Start Time: 2:30 PM  Video-Visit Details    Type of service:  Video Visit    Video End Time:3 PM    Originating Location (pt. Location): Home    Distant Location (provider location):  [facility]     Platform used for Video Visit: [employer]

## 2021-04-07 NOTE — PROGRESS NOTES
[facility]   Outpatient Sleep Medicine Follow-up Visit  April 7, 2021    Name: Umberto Narvaez MRN# [id number]   Age: 50 year old YOB: 1970     Date of Consultation: April 7, 2021  Consultation is requested by: No referring provider defined for this encounter.  Primary care provider: Hood Tolliver           Assessment and Plan:     Sleep Diagnoses:     Severe obstructive sleep apnea with hypoxemia effectively treated with bilevel positive airway pressure 18/12 with residual AHI of less than 4 and average use 7.5 hours daily. Continues to have persistent daytime fatigue with [place] of 15. Does not impact his daily routine but is concerned about severe sleepiness when he travels or has longer trips in the car      Comorbid Diagnoses:    Mood disturbance with generalized anxiety and depression    Morbid obesity BMI 48 with normal bicarbonate and awake oxygen saturation making daytime hypoventilation less likely     Summary Recommendations:    Initiate modafinil 200 mg daily as needed for sleepiness in the setting of effectively treated obstructive sleep apnea    Switch to a full facemask to reduce air leak fits contributing to humidifier water loss and drying of his upper airway             History of Present Illness:      Umberto Narvaez is a 50 year old male currently effectively treated with CPAP with resolution of obstructive sleep apnea but some persistent air leak.  He has had persistent daytime sleepiness despite 7.5 hours use and AHI of less than 5.  He has no features of type I narcolepsy. Previous studies below demonstrate extremely severe obstructive sleep apnea with hypoxemia and follow-up oximetry demonstrated modest residual likely REM related hypoxemia.                             Problem list:     Patient Active Problem List   Diagnosis     HYPERLIPIDEMIA LDL GOAL <160     Mild recurrent major depression (H)     Sleep related hypoventilation/hypoxemia in  other disease     LAURA (obstructive sleep apnea)     Edema     STEFANIA (generalized anxiety disorder)     Major depressive disorder, recurrent episode, moderate (H)     Morbid obesity with BMI of 50.0-59.9, adult (H)        Recently got  A new machine, using it everyday, leaking more tried 2 masks. 2 mo without working machine. Feels more rested then before.       SLEEP COMPLAINTS:  Cardio-respiratory    Snoring- 7x/week  Dyspnea- denies  Morning headaches or confusion-denies  Coexisting Lung disease: denies                Coexisting Heart disease: denies               Does patient have a bed partner: denies  Has bed partner been sleeping separately because of snoring:  denies            RLS Screen: When you try to relax in the evening or sleep at  night, do you ever have unpleasant, restless feelings in your  legs that can be relieved by walking or movement? denies     Periodic limb movement: denies     Narcolepsy:  denies sudden urges of sleep attacks    Cataplexy:  denies     Sleep paralysis:  denies      Hallucinations:   denies        Sleep Behaviors:    Leg symptoms/movements: denies    Motor restlessness:denies    Night terrors: denies    Bruxism: denies    Automatic behaviors: none     Other subjective complaints:    Anxiety or rumination denies    Pain and discomfort at  night: denies    Waking up with heart pounding or racing: denies    GERD or aspiration:denies               Parasomnia:   NREM - denies recurrent persistent confusional arousal, night eating, sleep walking or sleep terrors   REM  - denies dream enactment; injuries         Most Recent SCALES:    EPWORTH SLEEPINESS SCALE WITHIN 1 YEAR    Sitting and reading 3    Watching TV 2    Sitting, inactive in a public place (theatre or mtg.) 2     As a passenger in a car 2    Lying down to rest in the afternoon when circumstance permit 3    Sitting and talking to someone 1    Sitting quietly after lunch without alcohol 1    In a car, while stopped for a few  minutes in traffic 1    TOTAL SCORE 15        INSOMNIA SEVERITY INDEX WITHIN 1 YEAR   Difficulty falling asleep 3   Difficult staying asleep 3   Problems waking up to early 1   How SATISFIED/DISSATISFIED are you with your CURRENT sleep pattern? 4   How NOTICEABLE to others do you think your sleep pattern is in terms of your quality of life? 3   How WORRIED/DISTRESSED are you about your current sleep pattern? 2   To what extent do you consider your sleep problem to INTERFERE with your daily fuctioning(e.g. daytime fatigue, mood, ability to function at work/daily chores, concentration, mood,etc.) CURRENTLY? 3   INSOMNIA SEVERITY INDEX TOTAL SCORE 19    --absence of insomnia (0-7); sub-threshold insomnia (8-14); moderate insomnia (15-21); and severe insomnia (22-28)--        No data recorded    Objective:  CPAP Compliance Targets:   >70% days > 4 hours AHI < 5   30 days ending April , 2021  Mask type:  Full Face Mask   ResMed   Bilevel PAP 18/12 cmH2O 30 day usage data:  100% of days with > 4 hours of use. 0/30 days with no use.   Average use 452 minutes per day.   95%ile Leak 90.39 L/min.   AHI 4.03 events per hour.                               Medications:     Current Outpatient Medications   Medication Sig     buPROPion (WELLBUTRIN XL) 300 MG 24 hr tablet Take 1 Tablet BY MOUTH EVERY MORNING     DULoxetine (CYMBALTA) 60 MG capsule Take 1 Capsule BY MOUTH EVERY DAY     ORDER FOR DME Respironics BiPAP  IPAP 18 cm H2O EPAP 12 cm H2O  Lifetime need and heated humidity.         ORDER FOR DME Overnight Oximetry on BiPap to be done After 7/26/2012       vitamin D2 (ERGOCALCIFEROL) 39878 units (1250 mcg) capsule Take 1 capsule (50,000 Units) by mouth once a week     vitamin D3 (CHOLECALCIFEROL) 1.25 MG (03130 UT) capsule Take 1 capsule (50,000 Units) by mouth every 7 days for 8 doses     No current facility-administered medications for this visit.         No Known Allergies         Problem List:     Patient Active Problem  List   Diagnosis     HYPERLIPIDEMIA LDL GOAL <160     Mild recurrent major depression (H)     Sleep related hypoventilation/hypoxemia in other disease     LAURA (obstructive sleep apnea)     Edema     STEFANIA (generalized anxiety disorder)     Major depressive disorder, recurrent episode, moderate (H)     Morbid obesity with BMI of 50.0-59.9, adult (H)            Past Medical History:     Does not need 02 supplement at night   Past Medical History:   Diagnosis Date     Depression              Past Surgical History:    No h/o  upper airway surgery  Past Surgical History:   Procedure Laterality Date     SURGICAL HISTORY OF -   10/09    lasic     TONSILLECTOMY                Physical Examination:   Objective:    Reported vitals:  There were no vitals taken for this visit.   GENERAL: Healthy, alert and no distress  EYES: Eyes grossly normal to inspection.  No discharge or erythema, or obvious scleral/conjunctival abnormalities.  RESP: No audible wheeze, cough, or visible cyanosis.  No visible retractions or increased work of breathing.    SKIN: Visible skin clear. No significant rash, abnormal pigmentation or lesions.  NEURO: Cranial nerves grossly intact.  Mentation and speech appropriate for age.  PSYCH: Mentation appears normal, affect normal/bright, judgement and insight intact, normal speech and appearance well-groomed.        Copy to: Hood Tolliver MD 4/7/2021       Total time spent with patient: 15min >50% counseling and 10 minutes documenting and reviewing records

## 2021-04-07 NOTE — PATIENT INSTRUCTIONS
Your BMI is Body mass index is 48.07 kg/m .  Weight management is a personal decision.  If you are interested in exploring weight loss strategies, the following discussion covers the approaches that may be successful. Body mass index (BMI) is one way to tell whether you are at a healthy weight, overweight, or obese. It measures your weight in relation to your height.  A BMI of 18.5 to 24.9 is in the healthy range. A person with a BMI of 25 to 29.9 is considered overweight, and someone with a BMI of 30 or greater is considered obese. More than two-thirds of American adults are considered overweight or obese.  Being overweight or obese increases the risk for further weight gain. Excess weight may lead to heart disease and diabetes.  Creating and following plans for healthy eating and physical activity may help you improve your health.  Weight control is part of healthy lifestyle and includes exercise, emotional health, and healthy eating habits. Careful eating habits lifelong are the mainstay of weight control. Though there are significant health benefits from weight loss, long-term weight loss with diet alone may be very difficult to achieve- studies show long-term success with dietary management in less than 10% of people. Attaining a healthy weight may be especially difficult to achieve in those with severe obesity. In some cases, medications, devices and surgical management might be considered.  What can you do?  If you are overweight or obese and are interested in methods for weight loss, you should discuss this with your provider.     Consider reducing daily calorie intake by 500 calories.     Keep a food journal.     Avoiding skipping meals, consider cutting portions instead.    Diet combined with exercise helps maintain muscle while optimizing fat loss. Strength training is particularly important for building and maintaining muscle mass. Exercise helps reduce stress, increase energy, and improves fitness.  Increasing exercise without diet control, however, may not burn enough calories to loose weight.       Start walking three days a week 10-20 minutes at a time    Work towards walking thirty minutes five days a week     Eventually, increase the speed of your walking for 1-2 minutes at time    In addition, we recommend that you review healthy lifestyles and methods for weight loss available through the National Institutes of Health patient information sites:  http://win.niddk.nih.gov/publications/index.htm    And look into health and wellness programs that may be available through your health insurance provider, employer, local community center, or vannessa club.    Weight management plan: Patient was referred to their PCP to discuss a diet and exercise plan.

## 2021-04-08 NOTE — TELEPHONE ENCOUNTER
Prior Authorization Approval    Authorization Effective Date: 4/8/2021  Authorization Expiration Date: 4/8/2022  Medication: MODAFINIL 200 mg tabs-APPROVED  Approved Dose/Quantity:   Reference #:     Insurance Company: [employer] - Phone [phone] Fax [phone]  Expected CoPay:       CoPay Card Available:      Foundation Assistance Needed:    Which Pharmacy is filling the prescription (Not needed for infusion/clinic administered): [facility] - [place], [place] - [address] [address]  Pharmacy Notified: Yes  Patient Notified: No    Pharmacy will notify patient when medication is ready.

## 2021-04-08 NOTE — TELEPHONE ENCOUNTER
Central Prior Authorization Team   Phone: [phone]      PA Initiation    Medication: MODAFINIL 200 mg tabs-Initiated  Insurance Company: [employer] - Phone [phone] Fax [phone]  Pharmacy Filling the Rx: [facility] [place] - [id number] [address]  Filling Pharmacy Phone: [phone]  Filling Pharmacy Fax:    Start Date: 4/8/2021

## 2021-08-13 DIAGNOSIS — F33.1 MAJOR DEPRESSIVE DISORDER, RECURRENT EPISODE, MODERATE (H): ICD-10-CM

## 2021-08-13 RX ORDER — BUPROPION HYDROCHLORIDE 300 MG/1
TABLET ORAL
Qty: 90 TABLET | Refills: 1 | Status: SHIPPED | OUTPATIENT
Start: 2021-08-13 | End: 2022-01-21

## 2021-08-13 NOTE — TELEPHONE ENCOUNTER
"Requested Prescriptions   Pending Prescriptions Disp Refills     buPROPion (WELLBUTRIN XL) 300 MG 24 hr tablet [Pharmacy Med Name: bupropion HCl  mg 24 hr tablet, extended release] 90 tablet 1     Sig: Take 1 Tablet BY MOUTH EVERY MORNING       SSRIs Protocol Failed - 8/13/2021  8:01 AM        Failed - PHQ-9 score less than 5 in past 6 months     Please review last PHQ-9 score.           Passed - Medication is Bupropion     If the medication is Bupropion (Wellbutrin), and the patient is taking for smoking cessation; OK to refill.          Passed - Medication is active on med list        Passed - Patient is age 18 or older        Passed - Recent (6 mo) or future (30 days) visit within the authorizing provider's specialty     Patient had office visit in the last 6 months or has a visit in the next 30 days with authorizing provider or within the authorizing provider's specialty.  See \"Patient Info\" tab in inbasket, or \"Choose Columns\" in Meds & Orders section of the refill encounter.                 "

## 2021-09-25 ENCOUNTER — HEALTH MAINTENANCE LETTER (OUTPATIENT)
Age: 51
End: 2021-09-25

## 2021-10-31 ENCOUNTER — E-VISIT (OUTPATIENT)
Dept: FAMILY MEDICINE | Facility: CLINIC | Age: 51
End: 2021-10-31
Payer: COMMERCIAL

## 2021-10-31 DIAGNOSIS — Z20.822 SUSPECTED COVID-19 VIRUS INFECTION: Primary | ICD-10-CM

## 2021-10-31 PROCEDURE — 99421 OL DIG E/M SVC 5-10 MIN: CPT | Performed by: PHYSICIAN ASSISTANT

## 2021-11-01 NOTE — PATIENT INSTRUCTIONS
Dear Umberto Narvaez,    Your symptoms show that you may have coronavirus (COVID-19). This illness can cause fever, cough and trouble breathing. Many people get a mild case and get better on their own. Some people can get very sick.    Will I be tested for COVID-19?  We would like to test you for Covid-19 virus. I have placed orders for this test.     To schedule: go to your [employer] home page and scroll down to the section that says  You have an appointment that needs to be scheduled  and click the large green button that says  Schedule Now  and follow the steps to find the next available openings.    If you are unable to complete these [employer] scheduling steps, please call [phone] to schedule your testing.     Return to work/school/ guidance:  Please let your workplace manager and staffing office know when your quarantine ends     We can t give you an exact date as it depends on the above. You can calculate this on your own or work with your manager/staffing office to calculate this. (For example if you were exposed on 10/4, you would have to quarantine for 14 full days. That would be through 10/18. You could return on 10/19.)      If you receive a positive COVID-19 test result, follow the guidance of the those who are giving you the results. Usually the return to work is 10 (or in some cases 20 days from symptom onset.) If you work at [employer], you must also be cleared by Employee Occupational Health and Safety to return to work.        If you receive a negative COVID-19 test result and did not have a high risk exposure to someone with a known positive COVID-19 test, you can return to work once you're free of fever for 24 hours without fever-reducing medication and your symptoms are improving or resolved.      If you receive a negative COVID-19 test and If you had a high risk exposure to someone who has tested positive for COVID-19 then you can return to work 14 days after your last contact  with the positive individual    Note: If you have ongoing exposure to the covid positive person, this quarantine period may be more than 14 days. (For example, if you are continued to be exposed to your child who tested positive and cannot isolate from them, then the quarantine of 7-14 days can't start until your child is no longer contagious. This is typically 10 days from onset of the child's symptoms. So the total duration may be 17-24 days in this case.)    Sign up for [employer].   We know it's scary to hear that you might have COVID-19. We want to track your symptoms to make sure you're okay over the next 2 weeks. Please look for an email from [employer]--this is a free, online program that we'll use to keep in touch. To sign up, follow the link in the email you will receive. Learn more at [email or web address]    How can I take care of myself?    Get lots of rest. Drink extra fluids (unless a doctor has told you not to)    Take Tylenol (acetaminophen) or ibuprofen for fever or pain. If you have liver or kidney problems, ask your family doctor if it's okay to take Tylenol o ibuprofen    If you have other health problems (like cancer, heart failure, an organ transplant or severe kidney disease): Call your specialty clinic if you don't feel better in the next 2 days.    Know when to call 911. Emergency warning signs include:  o Trouble breathing or shortness of breath  o Pain or pressure in the chest that doesn't go away  o Feeling confused like you haven't felt before, or not being able to wake up  o Bluish-colored lips or face    Where can I get more information?  [employer] - About COVID-19:   [email or web address]    CDC - What to Do If You're Sick:   www.cdc.gov/coronavirus/2019-ncov/about/steps-when-sick.html

## 2021-11-02 ENCOUNTER — LAB (OUTPATIENT)
Dept: FAMILY MEDICINE | Facility: CLINIC | Age: 51
End: 2021-11-02
Attending: PHYSICIAN ASSISTANT
Payer: COMMERCIAL

## 2021-11-02 DIAGNOSIS — Z20.822 SUSPECTED COVID-19 VIRUS INFECTION: ICD-10-CM

## 2021-11-02 PROCEDURE — U0005 INFEC AGEN DETEC AMPLI PROBE: HCPCS

## 2021-11-02 PROCEDURE — U0003 INFECTIOUS AGENT DETECTION BY NUCLEIC ACID (DNA OR RNA); SEVERE ACUTE RESPIRATORY SYNDROME CORONAVIRUS 2 (SARS-COV-2) (CORONAVIRUS DISEASE [COVID-19]), AMPLIFIED PROBE TECHNIQUE, MAKING USE OF HIGH THROUGHPUT TECHNOLOGIES AS DESCRIBED BY CMS-2020-01-R: HCPCS

## 2021-11-03 LAB — SARS-COV-2 RNA RESP QL NAA+PROBE: NEGATIVE

## 2022-01-18 DIAGNOSIS — F41.1 GAD (GENERALIZED ANXIETY DISORDER): ICD-10-CM

## 2022-01-18 DIAGNOSIS — F33.1 MAJOR DEPRESSIVE DISORDER, RECURRENT EPISODE, MODERATE (H): ICD-10-CM

## 2022-01-21 RX ORDER — BUPROPION HYDROCHLORIDE 300 MG/1
TABLET ORAL
Qty: 30 TABLET | Refills: 11 | Status: SHIPPED | OUTPATIENT
Start: 2022-01-21 | End: 2023-02-10

## 2022-01-21 RX ORDER — DULOXETIN HYDROCHLORIDE 60 MG/1
CAPSULE, DELAYED RELEASE ORAL
Qty: 30 CAPSULE | Refills: 11 | Status: SHIPPED | OUTPATIENT
Start: 2022-01-21 | End: 2023-02-10

## 2022-01-21 NOTE — TELEPHONE ENCOUNTER
Routing refill request to provider for review/approval because:  Over due for appointment, PHQ was 14 last on 2-21-21.    RACHEL Preston

## 2022-04-25 NOTE — PROGRESS NOTES
Patient Quality Outreach    Patient is due for the following:   Colon Cancer Screening -  FIT, Colonoscopy, and Cologuard  Physical  - DUE NOW    NEXT STEPS:   Schedule a yearly physical    Type of outreach:    Sent letter.      Questions for provider review:    None     PAVITHRA Luna LPN

## 2022-04-25 NOTE — LETTER
[facility]  [address], [place] [address]  [phone]       April 25, 2022    Umberto Narvaez  [address] [place]  [facility] [place] [address]    Dear Umberto,    We care about your health and have reviewed your health plan and are making recommendations based on this review, to optimize your health.    You are in particular need of attention regarding:  -Colon Cancer Screening  -Wellness (Physical) Visit     We are recommending that you:                                              -schedule a WELLNESS (Physical) APPOINTMENT. We will check fasting labs the same day. You should have nothing to eat except water and meds for 8-10 hours prior.                                                                                                                                            -schedule a COLONOSCOPY to look for colon cancer (due every 10 years or 5 years in higher risk situations.)        Colon cancer is now the second leading cause of cancer-related deaths in the [place] for both men and women and there are over 130,000 new cases and 50,000 deaths per year from colon cancer.  Colonoscopies can prevent 90-95% of these deaths.  Problem lesions can be removed before they ever become cancer.  This test is not only looking for cancer, but also getting rid of precancerious lesions.    If you are under/uninsured, we recommend you contact the [employer] program. [employer] is a free colorectal cancer screening program that provides colonoscopies for eligible under/uninsured [place] men and women. If you are interested in receiving a free colonoscopy, please call Contactually at [phone] (mention code [employer]) to see if you re eligible.     If you do not wish to do a colonoscopy or cannot afford to do one, at this time, there is another option. It is called a FIT test or Fecal Immunochemical Occult Blood Test (take home stool sample kit).  It does not replace the colonoscopy  for colorectal cancer screening, but it can detect hidden bleeding in the lower colon.  It does need to be repeated every year and if a positive result is obtained, you would be referred for a colonoscopy.       If you have completed either one of these tests at another facility, please call with the details of when and where the tests were done and if they were normal or not. Or have the records sent to our clinic so that we can best coordinate your care.                                                                                                                                                   In addition, here is a list of due or overdue Health Maintenance reminders.    Health Maintenance Due   Topic Date Due     ANNUAL REVIEW OF HM ORDERS  Never done     Zoster (Shingles) Vaccine (1 of 2) Never done     COVID-19 Vaccine (2 - Booster for Violet series) 05/25/2021     Depression Assessment  08/17/2021     Flu Vaccine (1) 09/01/2021     Preventive Care Visit  02/17/2022     Colorectal Cancer Screening  02/25/2022       To address the above recommendations, we encourage you to contact us at [phone], via [employer] or by contacting Central Scheduling toll free at [phone] 24 hours a day. They will assist you with finding the most convenient time and location..    Thank you for trusting [facility] and we appreciate the opportunity to serve you.  We look forward to supporting your healthcare needs in the future.    Healthy Regards,    Your [facility] Team

## 2022-05-07 ENCOUNTER — HEALTH MAINTENANCE LETTER (OUTPATIENT)
Age: 52
End: 2022-05-07

## 2022-08-05 DIAGNOSIS — G47.33 OSA (OBSTRUCTIVE SLEEP APNEA): Primary | ICD-10-CM

## 2023-02-09 ENCOUNTER — MYC MEDICAL ADVICE (OUTPATIENT)
Dept: FAMILY MEDICINE | Facility: CLINIC | Age: 53
End: 2023-02-09
Payer: COMMERCIAL

## 2023-02-13 NOTE — TELEPHONE ENCOUNTER
Refills have already been sent for one month; closing this encounter.    Ivonne Espinosa RN  [facility]

## 2023-03-16 NOTE — ED AVS SNAPSHOT
[facility]    [address] [facility] [address]    Phone:  [phone]    Fax:  [phone]                                       Umberto Narvaez   MRN: [id number]    Department:  [facility]   Date of Visit:  1/29/2017           Patient Information     Date Of Birth          1970        Your diagnoses for this visit were:     Closed nondisplaced fracture of proximal phalanx of right little finger, initial encounter        You were seen by Marissa Fulton APRN CNP.        Discharge Instructions           Finger Fracture, Closed  You have a broken finger (fracture). This causes local pain, swelling, and bruising. This injury takes about 4 to 6 weeks to heal. Finger injuries are often treated with a splint or cast, or by taping the injured finger to the next one (charis taping). This protects the injured finger and holds the bone in position while it heals. More serious fractures may need surgery.    If the fingernail has been severely injured, it will probably fall off in 1 to 2 weeks. A new fingernail will usually start to grow back within a month.  Home care  Follow these guidelines when caring for yourself at home:    Keep your hand elevated to reduce pain and swelling. When sitting or lying down keep your arm above the level of your heart. You can do this by placing your arm on a pillow that rests on your chest or on a pillow at your side. This is most important during the first 2 days (48 hours) after the injury.    Put an ice pack on the injured area. Do this for 20 minutes every 1 to 2 hours the first day for pain relief. You can make an ice pack by wrapping a plastic bag of ice cubes in a thin towel. As the ice melts, be careful that the cast or splint doesn t get wet. Continue using the ice pack 3 to 4 times a day until the pain and swelling go away.    Keep the cast or splint completely dry at all times. Bathe with your cast or splint  out of the water. Protect it with a large plastic bag, rubber-banded at the top end. If a fiberglass cast or splint gets wet, you can dry it with a hair dryer.    If charis tape was put on and it becomes wet or dirty, change it. You may replace it with paper, plastic, or cloth tape. Cloth tape and paper tapes must be kept dry. Keep the charis tape in place for at least 4 weeks.    You may use acetaminophen or ibuprofen to control pain, unless another pain medicine was prescribed. If you have chronic liver or kidney disease, talk with your health care provider before using these medicines. Also talk with your provider if you ve had a stomach ulcer or GI bleeding.    Don t put creams or objects under the cast if you have itching.  Follow-up care  Follow up with your health care provider within 1 week, or as advised. This is to make sure the bone is healing the way it should.  If X-rays were taken, a radiologist will look at them. You will be told of any new findings that may affect your care.  When to seek medical advice  Call your health care provider right away if any of these occur:    The plaster cast or splint becomes wet or soft    The cast cracks    The fiberglass cast or splint stays wet for more than 24 hours    Pain or swelling gets worse    Redness, warmth, swelling, drainage from the wound, or foul odor from a cast or splint    Finger becomes more cold, blue, numb, or tingly    You can t move your finger    The skin around the cast becomes red    Fever of 101 F (38.3 C) or higher, or as directed by your health care provider    0363-9267 [employer] [address] [place] [address]57. All rights reserved. This information is not intended as a substitute for professional medical care. Always follow your healthcare professional's instructions.          24 Hour Appointment Hotline       To make an appointment at [facility], call [phone] ([phone]). If you don't have a  family doctor or clinic, we will help you find one. [facility] clinics are conveniently located to serve the needs of you and your family.          ED Discharge Orders     ORTHO  REFERRAL       [facility] is referring you to the Orthopedic  Services at [facility].       The  Representative will assist you in the coordination of your Orthopedic and Musculoskeletal Care as prescribed by your physician.    The  Representative will call you within 1 business day to help schedule your appointment, or you may contact the  Representative at:    All areas ~ [phone]     Type of Referral : Surgical / Specialist -- Hand      Timeframe requested: 1 - 2 days    Coverage of these services is subject to the terms and limitations of your health insurance plan.  Please call member services at your health plan with any benefit or coverage questions.      If X-rays, CT or MRI's have been performed, please contact the facility where they were done to arrange for , prior to your scheduled appointment.  Please bring this referral request to your appointment and present it to your specialist.                     Review of your medicines      START taking        Dose / Directions Last dose taken    HYDROcodone-acetaminophen 5-325 MG per tablet   Commonly known as:  NORCO   Dose:  1-2 tablet   Quantity:  15 tablet        Take 1-2 tablets by mouth every 4 hours as needed for moderate to severe pain   Refills:  0          Our records show that you are taking the medicines listed below. If these are incorrect, please call your family doctor or clinic.        Dose / Directions Last dose taken    * order for DME        Respironics BiPAP IPAP 18 cm H2O EPAP 12 cm H2O Lifetime need and heated humidity.   Refills:  0        * order for DME        Overnight Oximetry on BiPap to be done After 7/26/2012   Refills:  0        ZOLOFT PO   Dose:  150 mg         "Take 150 mg by mouth daily   Refills:  0        * Notice:  This list has 2 medication(s) that are the same as other medications prescribed for you. Read the directions carefully, and ask your doctor or other care provider to review them with you.            Prescriptions were sent or printed at these locations (1 Prescription)                   Other Prescriptions                Printed at Department/Unit printer (1 of 1)         HYDROcodone-acetaminophen (NORCO) 5-325 MG per tablet                Procedures and tests performed during your visit     Hand XR, G/E 3 views, right    Ice to affected area      Orders Needing Specimen Collection     None      Pending Results     No orders found from 1/28/2017 to 1/30/2017.            Pending Culture Results     No orders found from 1/28/2017 to 1/30/2017.       Test Results from your hospital stay           1/29/2017  5:46 PM - Interface, Radiant Ib      Narrative     XR HAND RT G/E 3 VW 1/29/2017 5:41 PM    HISTORY: Pain.    COMPARISON: None.        Impression     IMPRESSION: Mildly displaced fracture of the base of the fifth  proximal phalanx.    GEORGIE DALE MD                Thank you for choosing [facility]       Thank you for choosing [facility] for your care. Our goal is always to provide you with excellent care. Hearing back from our patients is one way we can continue to improve our services. Please take a few minutes to complete the written survey that you may receive in the mail after you visit with us. Thank you!        [employer] Information     [employer] lets you send messages to your doctor, view your test results, renew your prescriptions, schedule appointments and more. To sign up, go to [email or web address] . Click on \"Log in\" on the left side of the screen, which will take you to the Welcome page. Then click on \"Sign up Now\" on the right side of the page.     You will be asked to enter the access code listed below, as well as some personal information. " Please follow the directions to create your username and password.     Your access code is: [email or web address]  Expires: 2017  6:19 PM     Your access code will  in 90 days. If you need help or a new code, please call your [facility] clinic or [phone].        Care EveryWhere ID     This is your Care EveryWhere ID. This could be used by other organizations to access your [facility] medical records  [id number]        After Visit Summary       This is your record. Keep this with you and show to your community pharmacist(s) and doctor(s) at your next visit.                   Initial (On Arrival)

## 2023-04-22 ENCOUNTER — HEALTH MAINTENANCE LETTER (OUTPATIENT)
Age: 53
End: 2023-04-22

## 2023-06-02 ENCOUNTER — HEALTH MAINTENANCE LETTER (OUTPATIENT)
Age: 53
End: 2023-06-02

## 2023-09-08 ENCOUNTER — DOCUMENTATION ONLY (OUTPATIENT)
Dept: SLEEP MEDICINE | Facility: CLINIC | Age: 53
End: 2023-09-08
Payer: COMMERCIAL

## 2023-09-08 DIAGNOSIS — G47.33 OSA (OBSTRUCTIVE SLEEP APNEA): Primary | ICD-10-CM

## 2024-06-29 ENCOUNTER — HEALTH MAINTENANCE LETTER (OUTPATIENT)
Age: 54
End: 2024-06-29